# Patient Record
Sex: MALE | Race: OTHER | Employment: FULL TIME | URBAN - METROPOLITAN AREA
[De-identification: names, ages, dates, MRNs, and addresses within clinical notes are randomized per-mention and may not be internally consistent; named-entity substitution may affect disease eponyms.]

---

## 2017-07-06 ENCOUNTER — OFFICE VISIT (OUTPATIENT)
Dept: INTERNAL MEDICINE CLINIC | Facility: CLINIC | Age: 22
End: 2017-07-06

## 2017-07-06 VITALS
OXYGEN SATURATION: 99 % | SYSTOLIC BLOOD PRESSURE: 122 MMHG | WEIGHT: 175.19 LBS | DIASTOLIC BLOOD PRESSURE: 82 MMHG | HEART RATE: 76 BPM | BODY MASS INDEX: 26.55 KG/M2 | TEMPERATURE: 99 F | HEIGHT: 68 IN

## 2017-07-06 DIAGNOSIS — K21.9 GASTROESOPHAGEAL REFLUX DISEASE, ESOPHAGITIS PRESENCE NOT SPECIFIED: Primary | ICD-10-CM

## 2017-07-06 DIAGNOSIS — Z00.00 ANNUAL PHYSICAL EXAM: ICD-10-CM

## 2017-07-06 PROBLEM — Z80.0 FAMILY HISTORY OF STOMACH CANCER: Status: ACTIVE | Noted: 2017-07-06

## 2017-07-06 PROCEDURE — 99202 OFFICE O/P NEW SF 15 MIN: CPT | Performed by: FAMILY MEDICINE

## 2017-07-06 RX ORDER — PANTOPRAZOLE SODIUM 40 MG/1
40 TABLET, DELAYED RELEASE ORAL
Qty: 90 TABLET | Refills: 0 | Status: SHIPPED | OUTPATIENT
Start: 2017-07-06 | End: 2018-02-20

## 2017-07-07 NOTE — PROGRESS NOTES
CC:  Sore Throat and Gastro-esophageal Reflux      Hx of CC:  SEVERAL MONTHS WITH ACID REFLUX WHICH USED TO RESPOND TO TUMS BUT NOW DOES NOT. SOMETIMES GETS SORE/IRRITATED THROAT DUE TO IT.  NOT ASSOCIATED WITH MEALS.     PMHX:  NO CHRONIC ILLNESSES  PSHX:

## 2017-07-15 ENCOUNTER — NURSE ONLY (OUTPATIENT)
Dept: INTERNAL MEDICINE CLINIC | Facility: CLINIC | Age: 22
End: 2017-07-15

## 2017-07-15 DIAGNOSIS — Z00.00 ANNUAL PHYSICAL EXAM: ICD-10-CM

## 2017-07-15 LAB
CHOLEST SERPL-MCNC: 149 MG/DL (ref 110–200)
GLUCOSE SERPL-MCNC: 94 MG/DL (ref 70–99)
HDLC SERPL-MCNC: 47 MG/DL
LDLC SERPL CALC-MCNC: 90 MG/DL (ref 0–99)
NONHDLC SERPL-MCNC: 102 MG/DL
TRIGL SERPL-MCNC: 59 MG/DL (ref 1–149)

## 2017-07-15 PROCEDURE — 82947 ASSAY GLUCOSE BLOOD QUANT: CPT | Performed by: FAMILY MEDICINE

## 2017-07-15 PROCEDURE — 36415 COLL VENOUS BLD VENIPUNCTURE: CPT | Performed by: FAMILY MEDICINE

## 2017-07-15 PROCEDURE — 80061 LIPID PANEL: CPT | Performed by: FAMILY MEDICINE

## 2018-02-20 ENCOUNTER — OFFICE VISIT (OUTPATIENT)
Dept: INTERNAL MEDICINE CLINIC | Facility: CLINIC | Age: 23
End: 2018-02-20

## 2018-02-20 VITALS
RESPIRATION RATE: 12 BRPM | BODY MASS INDEX: 26.98 KG/M2 | HEIGHT: 68 IN | TEMPERATURE: 99 F | OXYGEN SATURATION: 98 % | SYSTOLIC BLOOD PRESSURE: 110 MMHG | WEIGHT: 178 LBS | HEART RATE: 90 BPM | DIASTOLIC BLOOD PRESSURE: 80 MMHG

## 2018-02-20 DIAGNOSIS — Z30.09 FAMILY PLANNING: Primary | ICD-10-CM

## 2018-02-20 PROCEDURE — 99212 OFFICE O/P EST SF 10 MIN: CPT | Performed by: FAMILY MEDICINE

## 2018-02-21 NOTE — PROGRESS NOTES
CC:  Consult (Pt presents to clinic to discuss possible vasectomy)      Hx of CC:  INTERESTED IN GETTING A VASECTOMY. HAS A 9YEAR OLD SON AND RECENTLY HAD TWINS WITH WIFE (THEY ARE 6 MONTHS OLD). PATIENT HAS RESEARCHED TOPIC.     Vitals:    02/20/18  181

## 2018-03-19 ENCOUNTER — OFFICE VISIT (OUTPATIENT)
Dept: FAMILY MEDICINE CLINIC | Facility: CLINIC | Age: 23
End: 2018-03-19

## 2018-03-19 VITALS
SYSTOLIC BLOOD PRESSURE: 110 MMHG | BODY MASS INDEX: 26.98 KG/M2 | WEIGHT: 178 LBS | RESPIRATION RATE: 17 BRPM | DIASTOLIC BLOOD PRESSURE: 64 MMHG | HEIGHT: 68 IN | OXYGEN SATURATION: 98 % | HEART RATE: 62 BPM

## 2018-03-19 DIAGNOSIS — K42.9 UMBILICAL HERNIA WITHOUT OBSTRUCTION AND WITHOUT GANGRENE: Primary | ICD-10-CM

## 2018-03-19 PROCEDURE — 99213 OFFICE O/P EST LOW 20 MIN: CPT | Performed by: FAMILY MEDICINE

## 2018-03-19 NOTE — PROGRESS NOTES
HPI:    Patient ID: Anayeli Yanes is a 20year old male.    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  Gets sharp pain on area wit ASSESSMENT/PLAN:   Umbilical hernia without obstruction and without gangrene  (primary encounter diagnosis)  -VSS  -no red flags  -Clinical course, prognosis, and treatment options of disease process discussed with pt.  -obtain U/S to confirm as no bulgi

## 2018-03-19 NOTE — PATIENT INSTRUCTIONS
What Is a Hernia? A hernia is when an organ or tissue pushes through a weak area in the belly (abdominal) wall. This weak area may be present at birth. Or it may be caused by abdominal strain over time.  If not treated, a hernia can get worse with time swelling around your hernia becomes larger, firmer, or more painful. These may be signs that your intestines are stuck in the abdominal wall. This is an emergency. The hernia must be repaired right away to avoid serious problems.   Date Last Reviewed: 7/1/2 abdominal wall. · Umbilical. This type occurs around the navel (belly button). · Incisional. This type occurs at the site of a previous surgery. The condition of the hernia can help determine how urgently it needs to be treated. · Reducible.  It goes ba care.  When to seek medical advice  Call your healthcare provider right away if any of these occur:  · Hernia hardens, swells, or grows larger  · Hernia can no longer be pushed back in  · Pain moves to the lower right abdomen (just below the waistline), or

## 2018-03-27 ENCOUNTER — OFFICE VISIT (OUTPATIENT)
Dept: SURGERY | Facility: CLINIC | Age: 23
End: 2018-03-27

## 2018-03-27 VITALS
HEIGHT: 66 IN | SYSTOLIC BLOOD PRESSURE: 136 MMHG | TEMPERATURE: 99 F | HEART RATE: 73 BPM | WEIGHT: 180 LBS | BODY MASS INDEX: 28.93 KG/M2 | DIASTOLIC BLOOD PRESSURE: 66 MMHG

## 2018-03-27 DIAGNOSIS — Z30.09 VASECTOMY EVALUATION: Primary | ICD-10-CM

## 2018-03-27 PROCEDURE — 99212 OFFICE O/P EST SF 10 MIN: CPT | Performed by: UROLOGY

## 2018-03-27 PROCEDURE — 99244 OFF/OP CNSLTJ NEW/EST MOD 40: CPT | Performed by: UROLOGY

## 2018-03-27 RX ORDER — DIAZEPAM 10 MG/1
TABLET ORAL
Qty: 1 TABLET | Refills: 0 | Status: SHIPPED | OUTPATIENT
Start: 2018-03-27 | End: 2018-04-11 | Stop reason: ALTCHOICE

## 2018-03-27 NOTE — PROGRESS NOTES
[address] Patient Status:  Outpatient    3/11/1995 MRN [id number]   Location [address], [place] Attending Connor Abbott.  [address] Day [address] 0 PCP Shalonda Bliss DO       UROLOGICAL CONSULTATION    CASE duodenal ulcer, gastritis, hiatal hernia, esophagitis, reflux, frequent constipation or exposure to hepatitis. 6.   No complaints of flank pain or kidney disease in the form of nephritis or glomerulonephritis.   7.   There is no history of orthopedic compl bilaterally symmetrical without bruits. Flank is without mass or tenderness. Abdomen is soft, nontender, normal active bowel sounds with no organomegaly or inguinal adenopathy. Suprapubic area is soft without evidence of bladder distention.     Gu exam s reasons for, nature of and alternatives with the patient including other alternatives and methods of birth control.   We do discuss that in one 1/200 patients have infection, and 1/200 patients can have a hematoma;  1/200 patients can have chronic testicula MD    ......................................... 3/27/2018

## 2018-03-31 ENCOUNTER — HOSPITAL ENCOUNTER (OUTPATIENT)
Dept: ULTRASOUND IMAGING | Facility: HOSPITAL | Age: 23
Discharge: HOME | End: 2018-03-31
Attending: FAMILY MEDICINE
Payer: COMMERCIAL

## 2018-03-31 DIAGNOSIS — K42.9 UMBILICAL HERNIA WITHOUT OBSTRUCTION AND WITHOUT GANGRENE: ICD-10-CM

## 2018-03-31 PROCEDURE — 76705 ECHO EXAM OF ABDOMEN: CPT | Performed by: FAMILY MEDICINE

## 2018-04-11 ENCOUNTER — OFFICE VISIT (OUTPATIENT)
Dept: FAMILY MEDICINE CLINIC | Facility: CLINIC | Age: 23
End: 2018-04-11

## 2018-04-11 VITALS
RESPIRATION RATE: 18 BRPM | HEIGHT: 66 IN | SYSTOLIC BLOOD PRESSURE: 120 MMHG | OXYGEN SATURATION: 98 % | DIASTOLIC BLOOD PRESSURE: 78 MMHG | WEIGHT: 177 LBS | BODY MASS INDEX: 28.45 KG/M2 | HEART RATE: 79 BPM

## 2018-04-11 DIAGNOSIS — R10.13 ACUTE EPIGASTRIC PAIN: ICD-10-CM

## 2018-04-11 DIAGNOSIS — R10.32 GROIN PAIN, LEFT: Primary | ICD-10-CM

## 2018-04-11 DIAGNOSIS — K42.9 UMBILICAL HERNIA WITHOUT OBSTRUCTION AND WITHOUT GANGRENE: ICD-10-CM

## 2018-04-11 PROCEDURE — 83690 ASSAY OF LIPASE: CPT | Performed by: FAMILY MEDICINE

## 2018-04-11 PROCEDURE — 85027 COMPLETE CBC AUTOMATED: CPT | Performed by: FAMILY MEDICINE

## 2018-04-11 PROCEDURE — 99213 OFFICE O/P EST LOW 20 MIN: CPT | Performed by: FAMILY MEDICINE

## 2018-04-11 PROCEDURE — 80053 COMPREHEN METABOLIC PANEL: CPT | Performed by: FAMILY MEDICINE

## 2018-04-11 NOTE — PROGRESS NOTES
HPI:    Patient ID: Remy Castellanos is a 20year old male. Abdominal Pain  Has known umbilical hernia  continues to feel increased bloating after eating  With lifting has pain in epigastric region right above hernia.  Lifts often 30-40lbs at work and has 3 Cardiovascular: Normal rate, regular rhythm and normal heart sounds. No murmur heard. Pulmonary/Chest: Breath sounds normal. No respiratory distress. He has no wheezes. He has no rales. Abdominal: Soft.  Bowel sounds are normal. He exhibits no diste Visit:  No prescriptions requested or ordered in this encounter    Imaging & Referrals:  SURGERY - INTERNAL  US GROIN LEFT COMPLETE ([id number]=[id number])         ST#[id number]

## 2018-04-30 ENCOUNTER — HOSPITAL ENCOUNTER (OUTPATIENT)
Dept: ULTRASOUND IMAGING | Age: 23
Discharge: HOME | End: 2018-04-30
Payer: COMMERCIAL

## 2018-04-30 ENCOUNTER — HOSPITAL ENCOUNTER (OUTPATIENT)
Dept: ULTRASOUND IMAGING | Age: 23
Discharge: HOME | End: 2018-04-30
Attending: FAMILY MEDICINE
Payer: COMMERCIAL

## 2018-04-30 DIAGNOSIS — N45.1 EPIDIDYMITIS: ICD-10-CM

## 2018-04-30 DIAGNOSIS — R10.32 GROIN PAIN, LEFT: ICD-10-CM

## 2018-04-30 PROCEDURE — 76870 US EXAM SCROTUM: CPT

## 2018-04-30 PROCEDURE — 76881 US COMPL JOINT R-T W/IMG: CPT | Performed by: FAMILY MEDICINE

## 2018-04-30 PROCEDURE — 93975 VASCULAR STUDY: CPT

## 2018-05-01 ENCOUNTER — TELEPHONE (OUTPATIENT)
Dept: FAMILY MEDICINE CLINIC | Facility: CLINIC | Age: 23
End: 2018-05-01

## 2018-05-01 NOTE — TELEPHONE ENCOUNTER
----- Message from Marivel Izaguirre MD sent at 4/30/2018  7:28 PM CDT -----  Will refer to Surgery for evaluation; ok to file.

## 2018-05-03 NOTE — TELEPHONE ENCOUNTER
Patient called back, notified of results and referral to surgery. Given Dr. Sherren Bair number. Verbalized understanding.

## 2018-05-08 ENCOUNTER — OFFICE VISIT (OUTPATIENT)
Dept: SURGERY | Facility: CLINIC | Age: 23
End: 2018-05-08

## 2018-05-08 VITALS — WEIGHT: 177 LBS | BODY MASS INDEX: 28.45 KG/M2 | HEIGHT: 66 IN

## 2018-05-08 DIAGNOSIS — K40.90 NON-RECURRENT UNILATERAL INGUINAL HERNIA WITHOUT OBSTRUCTION OR GANGRENE: Primary | ICD-10-CM

## 2018-05-08 PROCEDURE — 99204 OFFICE O/P NEW MOD 45 MIN: CPT | Performed by: SURGERY

## 2018-05-08 PROCEDURE — 99212 OFFICE O/P EST SF 10 MIN: CPT | Performed by: SURGERY

## 2018-05-08 NOTE — PROGRESS NOTES
HPI:    Patient ID: Bebe Soria is a 20year old male presenting with Patient presents with:  Hernia: Pt referred  by Dr. Hernán Arredondoor regarding umbilical and LIH x 1 month.   Pt c/o pain to umbilical area w/ heavy lifting and left groin area is more of a cons Physical Exam   Vitals reviewed. Constitutional: He is oriented to person, place, and time. Vital signs are normal. He appears well-developed and well-nourished. HENT:   Head: Normocephalic and atraumatic. Neck: Normal range of motion. Neck supple.

## 2018-05-09 ENCOUNTER — TELEPHONE (OUTPATIENT)
Dept: SURGERY | Facility: CLINIC | Age: 23
End: 2018-05-09

## 2018-05-09 DIAGNOSIS — K42.9 UMBILICAL HERNIA WITHOUT OBSTRUCTION AND WITHOUT GANGRENE: ICD-10-CM

## 2018-05-09 DIAGNOSIS — K40.90 LEFT INGUINAL HERNIA: Primary | ICD-10-CM

## 2018-05-09 NOTE — TELEPHONE ENCOUNTER
Pt states he is aware he is getting hernia repaired by groin area, pt asking if umbilical hernia is being repaired as well. Pls call thank you.

## 2018-05-10 ENCOUNTER — TELEPHONE (OUTPATIENT)
Dept: SURGERY | Facility: CLINIC | Age: 23
End: 2018-05-10

## 2018-05-10 NOTE — TELEPHONE ENCOUNTER
Pt's wife calling with questions regarding surgery. States pt is having pain on the opposite side of his hernia. Would like to know if both sides can be repaired. Please advise. Thank you.

## 2018-05-10 NOTE — TELEPHONE ENCOUNTER
Paged Dr. Sury Gillis at [phone]. Contacted patient. Informed him we did receive his messages and Dr. Sury Gillis was paged.  Patient states starting after his appt on 05/08/2018 he began to experience tenderness to right inguinal area that is the same as when he noticed h

## 2018-05-10 NOTE — TELEPHONE ENCOUNTER
Discussed with Dr. Traci Bellamy. Tiny asymptomatic umbilical hernia was noted upon exam, no surgery indicated for asymptomatic hernia. For right inguinal tenderness, if no bulge present then not concerned for hernia. Contacted patient and informed him of above.

## 2018-05-10 NOTE — TELEPHONE ENCOUNTER
pt called. Scheduled for hernia repair on Monday 5/14 with Dr. Milena Zuniga. Patient states he is having pain on the right side now. He thinks maybe its another hernia.   Please advise

## 2018-05-11 NOTE — TELEPHONE ENCOUNTER
POS and CPT codes updated. Please approval below.        REFERRED TO: [facility]  REFERRED FOR:SEE BELOW    Diagnoses:    K40.90 (ICD-10-CM) - 550.90 (ICD-9-CM) - Left inguinal hernia  Procedures:    37778 (CPT®) - LAPAROSCOPIC REPAIR OF INI

## 2018-05-11 NOTE — TELEPHONE ENCOUNTER
URGENT. Patient scheduled 05/14/2018 at [facility] for Left inguinal hernia repair, CPT: 40194. Referral was placed 05/09/2018. Please advise, thanks kindly.

## 2018-05-11 NOTE — TELEPHONE ENCOUNTER
Contacted patient. Surgery rescheduled to 05/17 at [facility]. Patient will receive call 05/16/2018 from [facility] with exact time and details. Patient verbalized understanding and all questions answered.

## 2018-05-11 NOTE — TELEPHONE ENCOUNTER
Patient changed date, location, and surgery to Laparoscopic left inguinal hernia repair with mesh, laparoscopic umbilical hernia repair, possible right inguinal hernia repair with mesh on 05/17/2018 at [address] [place] [address], [address] [place] [address] [address]: [address] [place] [address] , [id number], [id number]. Thank you!

## 2018-05-17 ENCOUNTER — ANESTHESIA (OUTPATIENT)
Dept: SURGERY | Facility: HOSPITAL | Age: 23
End: 2018-05-17
Payer: COMMERCIAL

## 2018-05-17 ENCOUNTER — HOSPITAL ENCOUNTER (OUTPATIENT)
Facility: HOSPITAL | Age: 23
Setting detail: HOSPITAL OUTPATIENT SURGERY
Discharge: HOME | End: 2018-05-17
Attending: SURGERY | Admitting: SURGERY
Payer: COMMERCIAL

## 2018-05-17 ENCOUNTER — SURGERY (OUTPATIENT)
Age: 23
End: 2018-05-17

## 2018-05-17 ENCOUNTER — ANESTHESIA EVENT (OUTPATIENT)
Dept: SURGERY | Facility: HOSPITAL | Age: 23
End: 2018-05-17
Payer: COMMERCIAL

## 2018-05-17 VITALS
WEIGHT: 170 LBS | SYSTOLIC BLOOD PRESSURE: 121 MMHG | OXYGEN SATURATION: 94 % | TEMPERATURE: 98 F | HEIGHT: 66 IN | HEART RATE: 85 BPM | RESPIRATION RATE: 18 BRPM | BODY MASS INDEX: 27.32 KG/M2 | DIASTOLIC BLOOD PRESSURE: 64 MMHG

## 2018-05-17 DIAGNOSIS — K40.90 LEFT INGUINAL HERNIA: ICD-10-CM

## 2018-05-17 DIAGNOSIS — K42.9 UMBILICAL HERNIA: Primary | ICD-10-CM

## 2018-05-17 DIAGNOSIS — K42.9 UMBILICAL HERNIA WITHOUT OBSTRUCTION AND WITHOUT GANGRENE: ICD-10-CM

## 2018-05-17 PROCEDURE — 49650 LAP ING HERNIA REPAIR INIT: CPT | Performed by: SURGERY

## 2018-05-17 PROCEDURE — 0YU64JZ SUPPLEMENT LEFT INGUINAL REGION WITH SYNTHETIC SUBSTITUTE, PERCUTANEOUS ENDOSCOPIC APPROACH: ICD-10-PCS | Performed by: SURGERY

## 2018-05-17 PROCEDURE — 49585 REPAIR UMBILICAL HERN,5+Y/O,REDUC: CPT | Performed by: SURGERY

## 2018-05-17 PROCEDURE — 0WQF4ZZ REPAIR ABDOMINAL WALL, PERCUTANEOUS ENDOSCOPIC APPROACH: ICD-10-PCS | Performed by: SURGERY

## 2018-05-17 DEVICE — PROGRIP MESH: Type: IMPLANTABLE DEVICE | Site: INGUINAL | Status: FUNCTIONAL

## 2018-05-17 RX ORDER — NALOXONE HYDROCHLORIDE 0.4 MG/ML
80 INJECTION, SOLUTION INTRAMUSCULAR; INTRAVENOUS; SUBCUTANEOUS AS NEEDED
Status: DISCONTINUED | OUTPATIENT
Start: 2018-05-17 | End: 2018-05-17

## 2018-05-17 RX ORDER — MEPERIDINE HYDROCHLORIDE 50 MG/ML
50 INJECTION INTRAMUSCULAR; INTRAVENOUS; SUBCUTANEOUS ONCE
Status: DISCONTINUED | OUTPATIENT
Start: 2018-05-17 | End: 2018-05-17

## 2018-05-17 RX ORDER — HYDROMORPHONE HYDROCHLORIDE 1 MG/ML
0.4 INJECTION, SOLUTION INTRAMUSCULAR; INTRAVENOUS; SUBCUTANEOUS EVERY 5 MIN PRN
Status: DISCONTINUED | OUTPATIENT
Start: 2018-05-17 | End: 2018-05-17

## 2018-05-17 RX ORDER — HALOPERIDOL 5 MG/ML
0.25 INJECTION INTRAMUSCULAR ONCE AS NEEDED
Status: DISCONTINUED | OUTPATIENT
Start: 2018-05-17 | End: 2018-05-17

## 2018-05-17 RX ORDER — GLYCOPYRROLATE 0.2 MG/ML
INJECTION INTRAMUSCULAR; INTRAVENOUS AS NEEDED
Status: DISCONTINUED | OUTPATIENT
Start: 2018-05-17 | End: 2018-05-17 | Stop reason: SURG

## 2018-05-17 RX ORDER — HYDROCODONE BITARTRATE AND ACETAMINOPHEN 10; 325 MG/1; MG/1
1 TABLET ORAL EVERY 4 HOURS PRN
Qty: 30 TABLET | Refills: 0 | Status: SHIPPED | OUTPATIENT
Start: 2018-05-17 | End: 2018-08-08 | Stop reason: ALTCHOICE

## 2018-05-17 RX ORDER — CEFAZOLIN SODIUM/WATER 2 G/20 ML
2 SYRINGE (ML) INTRAVENOUS ONCE
Status: COMPLETED | OUTPATIENT
Start: 2018-05-17 | End: 2018-05-17

## 2018-05-17 RX ORDER — SODIUM CHLORIDE, SODIUM LACTATE, POTASSIUM CHLORIDE, CALCIUM CHLORIDE 600; 310; 30; 20 MG/100ML; MG/100ML; MG/100ML; MG/100ML
INJECTION, SOLUTION INTRAVENOUS CONTINUOUS
Status: DISCONTINUED | OUTPATIENT
Start: 2018-05-17 | End: 2018-05-17

## 2018-05-17 RX ORDER — MIDAZOLAM HYDROCHLORIDE 1 MG/ML
INJECTION INTRAMUSCULAR; INTRAVENOUS AS NEEDED
Status: DISCONTINUED | OUTPATIENT
Start: 2018-05-17 | End: 2018-05-17 | Stop reason: SURG

## 2018-05-17 RX ORDER — HYDROCODONE BITARTRATE AND ACETAMINOPHEN 10; 325 MG/1; MG/1
1 TABLET ORAL EVERY 4 HOURS PRN
Status: DISCONTINUED | OUTPATIENT
Start: 2018-05-17 | End: 2018-05-17

## 2018-05-17 RX ORDER — FAMOTIDINE 20 MG/1
20 TABLET ORAL ONCE
Status: DISCONTINUED | OUTPATIENT
Start: 2018-05-17 | End: 2018-05-17 | Stop reason: HOSPADM

## 2018-05-17 RX ORDER — ROCURONIUM BROMIDE 10 MG/ML
INJECTION, SOLUTION INTRAVENOUS AS NEEDED
Status: DISCONTINUED | OUTPATIENT
Start: 2018-05-17 | End: 2018-05-17 | Stop reason: SURG

## 2018-05-17 RX ORDER — HYDROMORPHONE HYDROCHLORIDE 1 MG/ML
0.6 INJECTION, SOLUTION INTRAMUSCULAR; INTRAVENOUS; SUBCUTANEOUS EVERY 5 MIN PRN
Status: DISCONTINUED | OUTPATIENT
Start: 2018-05-17 | End: 2018-05-17

## 2018-05-17 RX ORDER — NEOSTIGMINE METHYLSULFATE 0.5 MG/ML
INJECTION INTRAVENOUS AS NEEDED
Status: DISCONTINUED | OUTPATIENT
Start: 2018-05-17 | End: 2018-05-17 | Stop reason: SURG

## 2018-05-17 RX ORDER — BUPIVACAINE HYDROCHLORIDE 5 MG/ML
INJECTION, SOLUTION EPIDURAL; INTRACAUDAL AS NEEDED
Status: DISCONTINUED | OUTPATIENT
Start: 2018-05-17 | End: 2018-05-17 | Stop reason: HOSPADM

## 2018-05-17 RX ORDER — HYDROMORPHONE HYDROCHLORIDE 1 MG/ML
0.2 INJECTION, SOLUTION INTRAMUSCULAR; INTRAVENOUS; SUBCUTANEOUS EVERY 5 MIN PRN
Status: DISCONTINUED | OUTPATIENT
Start: 2018-05-17 | End: 2018-05-17

## 2018-05-17 RX ORDER — DOCUSATE SODIUM 100 MG/1
100 CAPSULE, LIQUID FILLED ORAL 3 TIMES DAILY
Qty: 30 CAPSULE | Refills: 2 | Status: SHIPPED | OUTPATIENT
Start: 2018-05-17 | End: 2018-06-16

## 2018-05-17 RX ORDER — ONDANSETRON 2 MG/ML
4 INJECTION INTRAMUSCULAR; INTRAVENOUS ONCE AS NEEDED
Status: DISCONTINUED | OUTPATIENT
Start: 2018-05-17 | End: 2018-05-17

## 2018-05-17 RX ORDER — KETOROLAC TROMETHAMINE 30 MG/ML
30 INJECTION, SOLUTION INTRAMUSCULAR; INTRAVENOUS ONCE
Status: COMPLETED | OUTPATIENT
Start: 2018-05-17 | End: 2018-05-17

## 2018-05-17 RX ORDER — LIDOCAINE HYDROCHLORIDE 10 MG/ML
INJECTION, SOLUTION EPIDURAL; INFILTRATION; INTRACAUDAL; PERINEURAL AS NEEDED
Status: DISCONTINUED | OUTPATIENT
Start: 2018-05-17 | End: 2018-05-17 | Stop reason: SURG

## 2018-05-17 RX ORDER — ACETAMINOPHEN 500 MG
1000 TABLET ORAL ONCE
Status: COMPLETED | OUTPATIENT
Start: 2018-05-17 | End: 2018-05-17

## 2018-05-17 RX ORDER — HYDROCODONE BITARTRATE AND ACETAMINOPHEN 5; 325 MG/1; MG/1
2 TABLET ORAL AS NEEDED
Status: DISCONTINUED | OUTPATIENT
Start: 2018-05-17 | End: 2018-05-17

## 2018-05-17 RX ORDER — METOCLOPRAMIDE 10 MG/1
10 TABLET ORAL ONCE
Status: DISCONTINUED | OUTPATIENT
Start: 2018-05-17 | End: 2018-05-17 | Stop reason: HOSPADM

## 2018-05-17 RX ORDER — HYDROCODONE BITARTRATE AND ACETAMINOPHEN 5; 325 MG/1; MG/1
1 TABLET ORAL AS NEEDED
Status: DISCONTINUED | OUTPATIENT
Start: 2018-05-17 | End: 2018-05-17

## 2018-05-17 RX ORDER — ONDANSETRON 2 MG/ML
INJECTION INTRAMUSCULAR; INTRAVENOUS AS NEEDED
Status: DISCONTINUED | OUTPATIENT
Start: 2018-05-17 | End: 2018-05-17 | Stop reason: SURG

## 2018-05-17 RX ORDER — METOCLOPRAMIDE HYDROCHLORIDE 5 MG/ML
INJECTION INTRAMUSCULAR; INTRAVENOUS AS NEEDED
Status: DISCONTINUED | OUTPATIENT
Start: 2018-05-17 | End: 2018-05-17 | Stop reason: SURG

## 2018-05-17 RX ORDER — MEPERIDINE HYDROCHLORIDE 50 MG/ML
25 INJECTION INTRAMUSCULAR; INTRAVENOUS; SUBCUTANEOUS ONCE
Status: COMPLETED | OUTPATIENT
Start: 2018-05-17 | End: 2018-05-17

## 2018-05-17 RX ORDER — CEFAZOLIN SODIUM/WATER 2 G/20 ML
2 SYRINGE (ML) INTRAVENOUS ONCE
Status: DISCONTINUED | OUTPATIENT
Start: 2018-05-17 | End: 2018-05-17

## 2018-05-17 RX ORDER — PHENYLEPHRINE HCL 10 MG/ML
VIAL (ML) INJECTION AS NEEDED
Status: DISCONTINUED | OUTPATIENT
Start: 2018-05-17 | End: 2018-05-17 | Stop reason: SURG

## 2018-05-17 RX ADMIN — SODIUM CHLORIDE, SODIUM LACTATE, POTASSIUM CHLORIDE, CALCIUM CHLORIDE: 600; 310; 30; 20 INJECTION, SOLUTION INTRAVENOUS at 18:54:00

## 2018-05-17 RX ADMIN — CEFAZOLIN SODIUM/WATER 2 G: 2 G/20 ML SYRINGE (ML) INTRAVENOUS at 17:26:00

## 2018-05-17 RX ADMIN — ONDANSETRON 4 MG: 2 INJECTION INTRAMUSCULAR; INTRAVENOUS at 18:40:00

## 2018-05-17 RX ADMIN — METOCLOPRAMIDE HYDROCHLORIDE 10 MG: 5 INJECTION INTRAMUSCULAR; INTRAVENOUS at 18:41:00

## 2018-05-17 RX ADMIN — MIDAZOLAM HYDROCHLORIDE 2 MG: 1 INJECTION INTRAMUSCULAR; INTRAVENOUS at 17:22:00

## 2018-05-17 RX ADMIN — PHENYLEPHRINE HCL 100 MCG: 10 MG/ML VIAL (ML) INJECTION at 17:25:00

## 2018-05-17 RX ADMIN — ROCURONIUM BROMIDE 30 MG: 10 INJECTION, SOLUTION INTRAVENOUS at 17:22:00

## 2018-05-17 RX ADMIN — SODIUM CHLORIDE, SODIUM LACTATE, POTASSIUM CHLORIDE, CALCIUM CHLORIDE: 600; 310; 30; 20 INJECTION, SOLUTION INTRAVENOUS at 17:00:00

## 2018-05-17 RX ADMIN — LIDOCAINE HYDROCHLORIDE 50 MG: 10 INJECTION, SOLUTION EPIDURAL; INFILTRATION; INTRACAUDAL; PERINEURAL at 17:22:00

## 2018-05-17 RX ADMIN — NEOSTIGMINE METHYLSULFATE 2 MG: 0.5 INJECTION INTRAVENOUS at 18:35:00

## 2018-05-17 RX ADMIN — GLYCOPYRROLATE 0.2 MG: 0.2 INJECTION INTRAMUSCULAR; INTRAVENOUS at 17:30:00

## 2018-05-17 RX ADMIN — GLYCOPYRROLATE 0.4 MG: 0.2 INJECTION INTRAMUSCULAR; INTRAVENOUS at 18:35:00

## 2018-05-17 NOTE — OPERATIVE REPORT
Operative Report    Patient Name:  Jodee Agarwal  MR:  [id number]  :  3/11/1995  DOS:  18    Preop Dx: Left inguinal hernia [A11.89]  Umbilical hernia without obstruction and without gangrene [K42.9]  Postop Dx:   Left inguinal hernia [K40.90]Umb site to insert a 12-mm trocar. No trocar insertion injury was seen. A 5-mm trocar was placed in the left lower quadrant and a 5-mm trocar was placed in the right lower quadrant. The patient was then placed in Trendelenburg.   Examination of the pelvi

## 2018-05-17 NOTE — ANESTHESIA PREPROCEDURE EVALUATION
Anesthesia PreOp Note    HPI:     Jewels Jiang is a 20year old male who presents for preoperative consultation requested by: Sae Verde MD    Date of Surgery: 5/17/2018    Procedure(s):  LAPAROSCOPIC INGUINAL HERNIA REPAIR, PREPERITONEAL  LAPAROSCOPIC U Former Smoker     Types: Cigarettes    Quit date: 2/20/2016    Smokeless tobacco: Former User    Alcohol use Yes  3.0 oz/week    5 Cans of beer per week         Drug use: No    Sexual activity: Not on file     Other Topics Concern   None on file     Social patient desires the anesthetic management as planned.   Albert James  5/17/2018 2:30 PM

## 2018-05-17 NOTE — H&P
HP Update:    Pt seen and examined in the preoperative holding area. No significant clinical update noted. Plan to proceed with a laparoscopic right possible left inguinal hernia repair with mesh and a concurrent primary repair of an umbilical hernia.

## 2018-05-17 NOTE — ANESTHESIA POSTPROCEDURE EVALUATION
Patient: Jaky Gonzalez    Procedure Summary     Date:  05/17/18 Room / Location:  [facility] MAIN OR  / [address] [place] [address] MAIN OR    Anesthesia Start:  6451 Anesthesia Stop:      Procedures:       LAPAROSCOPIC INGUINAL HERNIA REPAIR, PREPERITONEAL (Left Abdomen)      LAPARO

## 2018-05-22 ENCOUNTER — TELEPHONE (OUTPATIENT)
Dept: SURGERY | Facility: CLINIC | Age: 23
End: 2018-05-22

## 2018-05-22 NOTE — TELEPHONE ENCOUNTER
Spoke with patient, he states he will wait until his appointment to get his return to work note. Also asked about dressing removal, the dressing covering his navel, stating it itches him.   Advised patient to remove the tape, keep the gauze in his navel an

## 2018-05-22 NOTE — TELEPHONE ENCOUNTER
Pt's wife is calling stating pt had surgery on 5-17-18. Pt needs a note to return to work on 5-29-18 include restrictions. Please add not to mychart. Caller also has question on the bandages.    Call

## 2018-05-25 ENCOUNTER — OFFICE VISIT (OUTPATIENT)
Dept: SURGERY | Facility: CLINIC | Age: 23
End: 2018-05-25

## 2018-05-25 VITALS — BODY MASS INDEX: 27 KG/M2 | WEIGHT: 170 LBS

## 2018-05-25 DIAGNOSIS — Z98.890 S/P INGUINAL HERNIA REPAIR: Primary | ICD-10-CM

## 2018-05-25 DIAGNOSIS — Z87.19 S/P INGUINAL HERNIA REPAIR: Primary | ICD-10-CM

## 2018-05-25 PROCEDURE — 99212 OFFICE O/P EST SF 10 MIN: CPT | Performed by: SURGERY

## 2018-05-25 PROCEDURE — 99024 POSTOP FOLLOW-UP VISIT: CPT | Performed by: SURGERY

## 2018-05-25 NOTE — PROGRESS NOTES
S:  Jewels Jiang is a 20year old male sp lap inguinal hernia repair with mesh and primary umbilical hernia repair  Doing well, tolerating a general diet with normal bowel habits      O:  Wt 170 lb (77.1 kg)   BMI 27.44 kg/m²   GEN:  No acute distress  A

## 2018-06-05 ENCOUNTER — TELEPHONE (OUTPATIENT)
Dept: SURGERY | Facility: CLINIC | Age: 23
End: 2018-06-05

## 2018-06-05 NOTE — TELEPHONE ENCOUNTER
Pt had hernia sx on 5/17, pt is scheduled for VAS on 6/8, asking if it is ok to have procedure? Pls call thank you.

## 2018-06-06 NOTE — TELEPHONE ENCOUNTER
Tasked to [facility]. Please see pt's question below and advise.

## 2018-06-07 ENCOUNTER — TELEPHONE (OUTPATIENT)
Dept: SURGERY | Facility: CLINIC | Age: 23
End: 2018-06-07

## 2018-06-07 NOTE — TELEPHONE ENCOUNTER
Patient has a vas scheduled for tomorrow. Is looking to cancel and reschedule. Please call. Thank you.

## 2018-06-07 NOTE — TELEPHONE ENCOUNTER
Please contact patient and we can simply examine patient on the day of scheduled vasectomy but as far as I am concerned I do not think the hernia should have much bearing on him being able to have vasectomy

## 2018-06-07 NOTE — TELEPHONE ENCOUNTER
Pt returned the call. Pt cxl vas scheduled 6-8-18 with dr Vandana Wick. Per  staff, pt offered appt for consult with dr Lauro Slade. appt now scheduled 8-8-18.

## 2018-06-08 NOTE — TELEPHONE ENCOUNTER
Noted.  Patient already had cancelled his vasectomy and is now rescheduled to see new Urologist in Aug.

## 2018-06-19 ENCOUNTER — OFFICE VISIT (OUTPATIENT)
Dept: SURGERY | Facility: CLINIC | Age: 23
End: 2018-06-19

## 2018-06-19 DIAGNOSIS — Z98.890 S/P INGUINAL HERNIA REPAIR: Primary | ICD-10-CM

## 2018-06-19 DIAGNOSIS — Z87.19 S/P INGUINAL HERNIA REPAIR: Primary | ICD-10-CM

## 2018-06-19 PROCEDURE — 99212 OFFICE O/P EST SF 10 MIN: CPT | Performed by: SURGERY

## 2018-06-19 PROCEDURE — 99024 POSTOP FOLLOW-UP VISIT: CPT | Performed by: SURGERY

## 2018-08-07 ENCOUNTER — TELEPHONE (OUTPATIENT)
Dept: SURGERY | Facility: CLINIC | Age: 23
End: 2018-08-07

## 2018-08-07 NOTE — TELEPHONE ENCOUNTER
Pt's spouse states pt has been having abdominal pain since yesterday. Pt thinks he may have pulled his hernia while picking up something yesterday and he has been walking with pain ever since. Please advise. Thank you.

## 2018-08-07 NOTE — TELEPHONE ENCOUNTER
Per patient, he states he was having abdominal pain, lifted a gallon of water prior to this. Offered him an appointment for 8/8/2018, patient accepted.

## 2018-08-08 ENCOUNTER — OFFICE VISIT (OUTPATIENT)
Dept: SURGERY | Facility: CLINIC | Age: 23
End: 2018-08-08

## 2018-08-08 VITALS
DIASTOLIC BLOOD PRESSURE: 75 MMHG | HEIGHT: 66 IN | HEART RATE: 75 BPM | SYSTOLIC BLOOD PRESSURE: 120 MMHG | TEMPERATURE: 99 F | WEIGHT: 177 LBS | BODY MASS INDEX: 28.45 KG/M2

## 2018-08-08 DIAGNOSIS — Z30.09 FAMILY PLANNING: ICD-10-CM

## 2018-08-08 DIAGNOSIS — Z87.19 S/P INGUINAL HERNIA REPAIR: Primary | ICD-10-CM

## 2018-08-08 DIAGNOSIS — Z30.09 STERILIZATION CONSULT: ICD-10-CM

## 2018-08-08 DIAGNOSIS — Z98.890 S/P INGUINAL HERNIA REPAIR: Primary | ICD-10-CM

## 2018-08-08 DIAGNOSIS — Z30.09 ENCOUNTER FOR VASECTOMY ASSESSMENT: Primary | ICD-10-CM

## 2018-08-08 PROCEDURE — 99244 OFF/OP CNSLTJ NEW/EST MOD 40: CPT | Performed by: UROLOGY

## 2018-08-08 PROCEDURE — 99024 POSTOP FOLLOW-UP VISIT: CPT | Performed by: SURGERY

## 2018-08-08 PROCEDURE — 99212 OFFICE O/P EST SF 10 MIN: CPT | Performed by: UROLOGY

## 2018-08-08 PROCEDURE — 99212 OFFICE O/P EST SF 10 MIN: CPT | Performed by: SURGERY

## 2018-08-08 RX ORDER — DIAZEPAM 10 MG/1
10 TABLET ORAL ONCE
Qty: 1 TABLET | Refills: 0 | Status: SHIPPED | OUTPATIENT
Start: 2018-08-08 | End: 2018-08-08

## 2018-08-08 RX ORDER — HYDROCODONE BITARTRATE AND ACETAMINOPHEN 5; 325 MG/1; MG/1
1 TABLET ORAL EVERY 6 HOURS PRN
Qty: 5 TABLET | Refills: 0 | Status: SHIPPED | OUTPATIENT
Start: 2018-08-08 | End: 2018-08-09

## 2018-08-08 NOTE — PROGRESS NOTES
[facility], [facility] Urology  Initial Office Consultation    HPI:   Christina Stafford is a 20year old male here today for evaluation for bilateral vasectomy has a mode of sterilization and family planning.   He is  and has 1kids (9year-old son and 1-year- prescription, however do not start taking the pills. Bring them with you to the urology office on the day of your procedure. The office staff will instruct you when and how much to take.  Disp: 5 tablet Rfl: 0       Allergies: Patient has no known allergies vasa bilaterally   Musculoskeletal: Normal range of motion. Neurological: He is alert and oriented to person, place, and time. Skin: Skin is warm and dry. Psychiatric: He has a normal mood and affect.        LABS:      Lab Results  Component Value Varun vas occlusion known to have a low occlusive failure rate has been used. · Patients should refrain from ejaculation for approximately one week after vasectomy.     · Options for fertility after vasectomy include vasectomy reversal and sperm retrieval with

## 2018-08-08 NOTE — PROGRESS NOTES
S:  Jodee Agarwal is a 20year old male sp lap left inguinal hernia repair with mesh  Had recent brief left groin discomfort. Saw commercial regarding mesh complications and wife insisted that he return for a visit.       O:  VSS  GEN:  No acute distress

## 2018-08-09 ENCOUNTER — TELEPHONE (OUTPATIENT)
Dept: SURGERY | Facility: CLINIC | Age: 23
End: 2018-08-09

## 2018-08-09 NOTE — TELEPHONE ENCOUNTER
Spoke with ELISSA and he gave auth to reschd pt to 8/24 at 1 pm.   I called the pt back and informed him that I could offer an appt for 8/24 at 1 pm and he accepted and I reschd the appt in the system.

## 2018-08-09 NOTE — TELEPHONE ENCOUNTER
Patient would like to know if vasectomy can be rescheduled. Currently scheduled for 08/17. Would like to reschedule to the following week on the 08/24/18 if possible. Please call. Thank you.

## 2018-08-24 ENCOUNTER — OFFICE VISIT (OUTPATIENT)
Dept: SURGERY | Facility: CLINIC | Age: 23
End: 2018-08-24

## 2018-08-24 VITALS
SYSTOLIC BLOOD PRESSURE: 110 MMHG | DIASTOLIC BLOOD PRESSURE: 80 MMHG | WEIGHT: 175 LBS | HEART RATE: 78 BPM | TEMPERATURE: 98 F | RESPIRATION RATE: 16 BRPM | BODY MASS INDEX: 28.13 KG/M2 | HEIGHT: 66 IN

## 2018-08-24 DIAGNOSIS — Z30.8 POSTVASECTOMY SPERM COUNT: Primary | ICD-10-CM

## 2018-08-24 DIAGNOSIS — Z30.2 ENCOUNTER FOR STERILIZATION: ICD-10-CM

## 2018-08-24 PROCEDURE — 55250 REMOVAL OF SPERM DUCT(S): CPT | Performed by: UROLOGY

## 2018-08-24 NOTE — PROCEDURES
UROLOGY PROCEDURE NOTE      PREOPERATIVE DIAGNOSIS:          Desires voluntary sterilization - bilateral vasectomy. POSTOPERATIVE DIAGNOSIS:         Same. PROCEDURE PERFORMED: Voluntary sterilization - bilateral vasectomy.       ANESTHESIA:   Ke Rank small Telfa dressing, then a small 2 x 2 sterile dressing, then Tegaderm dressing. Each segment of vas deferens was sent separately in formalin to pathology for identification. The patient tolerated the procedure well.        [facility]  08/24/18 3:1

## 2018-09-18 ENCOUNTER — OFFICE VISIT (OUTPATIENT)
Dept: SURGERY | Facility: CLINIC | Age: 23
End: 2018-09-18

## 2018-09-18 VITALS
SYSTOLIC BLOOD PRESSURE: 120 MMHG | BODY MASS INDEX: 28.93 KG/M2 | TEMPERATURE: 98 F | WEIGHT: 180 LBS | HEIGHT: 66 IN | DIASTOLIC BLOOD PRESSURE: 88 MMHG | RESPIRATION RATE: 16 BRPM | HEART RATE: 73 BPM

## 2018-09-18 DIAGNOSIS — Z98.52 S/P VASECTOMY: Primary | ICD-10-CM

## 2018-09-18 NOTE — PROGRESS NOTES
[facility], [facility] Urology  Follow Up Visit    HPI: Kristian Gold is a 20year old male presents for a follow up visit for vasectomy. Patient is s/p bilateral vasectomy in the office on 08/24/2018. He is doing well.  He complained of some mild discomfort and

## 2018-12-12 ENCOUNTER — TELEPHONE (OUTPATIENT)
Dept: SURGERY | Facility: CLINIC | Age: 23
End: 2018-12-12

## 2019-04-23 ENCOUNTER — OFFICE VISIT (OUTPATIENT)
Dept: INTERNAL MEDICINE CLINIC | Facility: CLINIC | Age: 24
End: 2019-04-23

## 2019-04-23 VITALS
OXYGEN SATURATION: 98 % | HEART RATE: 66 BPM | DIASTOLIC BLOOD PRESSURE: 86 MMHG | SYSTOLIC BLOOD PRESSURE: 126 MMHG | WEIGHT: 178.38 LBS | BODY MASS INDEX: 28.67 KG/M2 | HEIGHT: 66 IN

## 2019-04-23 DIAGNOSIS — K29.90 GASTRITIS AND DUODENITIS: Primary | ICD-10-CM

## 2019-04-23 PROCEDURE — 99213 OFFICE O/P EST LOW 20 MIN: CPT | Performed by: FAMILY MEDICINE

## 2019-04-23 RX ORDER — PANTOPRAZOLE SODIUM 40 MG/1
40 TABLET, DELAYED RELEASE ORAL
Qty: 90 TABLET | Refills: 0 | Status: SHIPPED | OUTPATIENT
Start: 2019-04-23 | End: 2021-12-07 | Stop reason: ALTCHOICE

## 2019-04-24 NOTE — PROGRESS NOTES
CC:  Other (Patient complains of burning sensation in stomach)      Hx of CC:  2 WEEK H/O INTERMITTENT STOMACH BURNING SENSATION AND BLOATING-->A LITTLE WORSE POST MEALS. GERD FORMERLY IMPROVED WITH TREATMENT. FATHER HAD H/O GASTRIC CA.     Vitals:    0

## 2019-04-30 ENCOUNTER — NURSE ONLY (OUTPATIENT)
Dept: INTERNAL MEDICINE CLINIC | Facility: CLINIC | Age: 24
End: 2019-04-30

## 2019-04-30 PROCEDURE — 87338 HPYLORI STOOL AG IA: CPT | Performed by: FAMILY MEDICINE

## 2019-05-09 ENCOUNTER — OFFICE VISIT (OUTPATIENT)
Dept: INTERNAL MEDICINE CLINIC | Facility: CLINIC | Age: 24
End: 2019-05-09

## 2019-05-09 VITALS
DIASTOLIC BLOOD PRESSURE: 72 MMHG | HEART RATE: 70 BPM | HEIGHT: 66 IN | OXYGEN SATURATION: 99 % | RESPIRATION RATE: 17 BRPM | BODY MASS INDEX: 28.26 KG/M2 | SYSTOLIC BLOOD PRESSURE: 120 MMHG | WEIGHT: 175.81 LBS | TEMPERATURE: 99 F

## 2019-05-09 DIAGNOSIS — R10.11 POSTPRANDIAL RUQ PAIN: Primary | ICD-10-CM

## 2019-05-09 PROCEDURE — 99213 OFFICE O/P EST LOW 20 MIN: CPT | Performed by: FAMILY MEDICINE

## 2019-05-10 NOTE — PROGRESS NOTES
CC:  Medication Follow-Up (Patient presents for f/u on last visit & medication )      Hx of CC:  GASTRITIS AND ACID REFLUX RESOLVED POST PANTOPRAZOLE. HOWEVER, MILD NAUSEA AND POST PRANDIAL BLOATING/DISCOMFORT STILL PRESENT AT TIMES.     Vitals:    05/09/1

## 2019-06-01 ENCOUNTER — HOSPITAL ENCOUNTER (OUTPATIENT)
Dept: ULTRASOUND IMAGING | Facility: HOSPITAL | Age: 24
Discharge: HOME | End: 2019-06-01
Attending: FAMILY MEDICINE
Payer: COMMERCIAL

## 2019-06-01 DIAGNOSIS — R10.11 POSTPRANDIAL RUQ PAIN: ICD-10-CM

## 2019-06-01 PROCEDURE — 76705 ECHO EXAM OF ABDOMEN: CPT | Performed by: FAMILY MEDICINE

## 2019-09-21 ENCOUNTER — OFFICE VISIT (OUTPATIENT)
Dept: FAMILY MEDICINE CLINIC | Facility: CLINIC | Age: 24
End: 2019-09-21

## 2019-09-21 VITALS
WEIGHT: 190 LBS | SYSTOLIC BLOOD PRESSURE: 120 MMHG | DIASTOLIC BLOOD PRESSURE: 74 MMHG | OXYGEN SATURATION: 98 % | BODY MASS INDEX: 30.53 KG/M2 | TEMPERATURE: 98 F | HEART RATE: 76 BPM | HEIGHT: 66 IN

## 2019-09-21 DIAGNOSIS — H66.002 NON-RECURRENT ACUTE SUPPURATIVE OTITIS MEDIA OF LEFT EAR WITHOUT SPONTANEOUS RUPTURE OF TYMPANIC MEMBRANE: Primary | ICD-10-CM

## 2019-09-21 PROCEDURE — 99213 OFFICE O/P EST LOW 20 MIN: CPT | Performed by: NURSE PRACTITIONER

## 2019-09-21 RX ORDER — AMOXICILLIN 875 MG/1
875 TABLET, COATED ORAL 2 TIMES DAILY
Qty: 20 TABLET | Refills: 0 | Status: SHIPPED | OUTPATIENT
Start: 2019-09-21 | End: 2019-10-01

## 2019-09-21 NOTE — PROGRESS NOTES
CHIEF COMPLAINT:   Patient presents with:  Pain: pt states he has pain in throat on left side when putting pressure or when yawning x 2 wks       HPI:   Sindi Case is a 30year old male who presents to clinic today with complaints of persisting pain Right TM: clear, no bulging, no retraction, no effusion, bony landmarks intact. Left TM: erythematous, no bulging, no retraction, no effusion, bony landmarks intact.   NOSE: nostrils patent, no nasal discharge, nasal mucosa pink and noninflamed  THROAT: o The following are general care guidelines:  · Finish all of the antibiotic medicine given, even though you may feel better after the first few days.   · You may use over-the-counter medicine, such as acetaminophen or ibuprofen, to control pain and fever, un

## 2019-11-26 ENCOUNTER — HOSPITAL ENCOUNTER (EMERGENCY)
Facility: HOSPITAL | Age: 24
Discharge: HOME | End: 2019-11-27
Attending: EMERGENCY MEDICINE
Payer: COMMERCIAL

## 2019-11-26 ENCOUNTER — HOSPITAL ENCOUNTER (OUTPATIENT)
Age: 24
Discharge: HOME | End: 2019-11-26
Attending: EMERGENCY MEDICINE
Payer: COMMERCIAL

## 2019-11-26 VITALS
DIASTOLIC BLOOD PRESSURE: 78 MMHG | OXYGEN SATURATION: 97 % | HEART RATE: 118 BPM | TEMPERATURE: 102 F | RESPIRATION RATE: 20 BRPM | WEIGHT: 180 LBS | HEIGHT: 66 IN | BODY MASS INDEX: 28.93 KG/M2 | SYSTOLIC BLOOD PRESSURE: 133 MMHG

## 2019-11-26 DIAGNOSIS — J18.9 PNEUMONIA OF LEFT LOWER LOBE DUE TO INFECTIOUS ORGANISM: Primary | ICD-10-CM

## 2019-11-26 DIAGNOSIS — B34.9 VIRAL SYNDROME: Primary | ICD-10-CM

## 2019-11-26 PROCEDURE — 99213 OFFICE O/P EST LOW 20 MIN: CPT

## 2019-11-26 PROCEDURE — 99284 EMERGENCY DEPT VISIT MOD MDM: CPT

## 2019-11-26 PROCEDURE — 99204 OFFICE O/P NEW MOD 45 MIN: CPT

## 2019-11-26 PROCEDURE — 87430 STREP A AG IA: CPT

## 2019-11-26 PROCEDURE — 96361 HYDRATE IV INFUSION ADD-ON: CPT

## 2019-11-26 PROCEDURE — 96374 THER/PROPH/DIAG INJ IV PUSH: CPT

## 2019-11-26 RX ORDER — AZITHROMYCIN 250 MG/1
TABLET, FILM COATED ORAL
Qty: 1 PACKAGE | Refills: 0 | Status: SHIPPED | OUTPATIENT
Start: 2019-11-26 | End: 2020-02-24 | Stop reason: ALTCHOICE

## 2019-11-26 RX ORDER — ECHINACEA PURPUREA EXTRACT 125 MG
2 TABLET ORAL AS NEEDED
Qty: 60 ML | Refills: 0 | Status: SHIPPED | OUTPATIENT
Start: 2019-11-26 | End: 2019-12-01

## 2019-11-26 RX ORDER — BENZONATATE 100 MG/1
100 CAPSULE ORAL 3 TIMES DAILY PRN
Qty: 30 CAPSULE | Refills: 0 | Status: SHIPPED | OUTPATIENT
Start: 2019-11-26 | End: 2019-12-26

## 2019-11-26 RX ORDER — ALBUTEROL SULFATE 90 UG/1
2 AEROSOL, METERED RESPIRATORY (INHALATION) EVERY 4 HOURS PRN
Qty: 1 INHALER | Refills: 0 | Status: SHIPPED | OUTPATIENT
Start: 2019-11-26 | End: 2019-12-26

## 2019-11-26 RX ORDER — METHYLPREDNISOLONE 4 MG/1
TABLET ORAL
Qty: 1 PACKAGE | Refills: 0 | Status: SHIPPED | OUTPATIENT
Start: 2019-11-26 | End: 2020-02-24 | Stop reason: ALTCHOICE

## 2019-11-26 RX ORDER — FLUTICASONE PROPIONATE 50 MCG
2 SPRAY, SUSPENSION (ML) NASAL DAILY
Qty: 16 G | Refills: 0 | Status: SHIPPED | OUTPATIENT
Start: 2019-11-26 | End: 2019-12-26

## 2019-11-26 RX ORDER — KETOROLAC TROMETHAMINE 30 MG/ML
30 INJECTION, SOLUTION INTRAMUSCULAR; INTRAVENOUS ONCE
Status: COMPLETED | OUTPATIENT
Start: 2019-11-26 | End: 2019-11-27

## 2019-11-27 ENCOUNTER — APPOINTMENT (OUTPATIENT)
Dept: GENERAL RADIOLOGY | Facility: HOSPITAL | Age: 24
End: 2019-11-27
Attending: EMERGENCY MEDICINE
Payer: COMMERCIAL

## 2019-11-27 VITALS
DIASTOLIC BLOOD PRESSURE: 74 MMHG | OXYGEN SATURATION: 95 % | TEMPERATURE: 99 F | RESPIRATION RATE: 18 BRPM | HEART RATE: 96 BPM | SYSTOLIC BLOOD PRESSURE: 108 MMHG

## 2019-11-27 PROCEDURE — 71045 X-RAY EXAM CHEST 1 VIEW: CPT | Performed by: EMERGENCY MEDICINE

## 2019-11-27 PROCEDURE — 80048 BASIC METABOLIC PNL TOTAL CA: CPT | Performed by: EMERGENCY MEDICINE

## 2019-11-27 PROCEDURE — 85025 COMPLETE CBC W/AUTO DIFF WBC: CPT | Performed by: EMERGENCY MEDICINE

## 2019-11-27 NOTE — ED NOTES
Patient states that he was at [address] [place] [address] and diagnosed with PNA. Patient states that she has been having more trouble breathing since diagnosis. Patient also stating that he is dizzy at times and has vomited PTA.  Patient stated that he has taken his first

## 2019-11-27 NOTE — ED INITIAL ASSESSMENT (HPI)
Pt c/o uri symptoms today with recent pneumonia dx with difficulty breathing, + vomiting at home, feeling worse.

## 2019-11-27 NOTE — ED PROVIDER NOTES
Patient Seen in: [facility] AND [facility] Emergency Department      History   Patient presents with:  Cough    Stated Complaint: Chest tightness, lightheaded, dx w/ pneumonia 2 hours ago    HPI    12-year-old [facility]. Negative for abdominal pain, diarrhea and vomiting. Genitourinary: Negative for dysuria, flank pain and frequency. Musculoskeletal: Negative for back pain. Skin: Negative for rash.    Neurological: Positive for dizziness and light place, and time.       Comments: 5/5 strength in b/l UEs and LEs, normal sensation in all extremities, normal finger to nose b/l, normal gait, no facial asymmetry, normal speech             ED Course       Pulse Oximeter:  Pulse oximetry on room air is 95%, Available and Reviewed by me while in ED:        XRAY CHEST ONE VIEW  0012 ct    Comparison: None      IMPRESSION:  Mild increased opacity seen in the right lung base, could be related to overlapping shadows but raises concern for pneumonia.  PA/lateral tawana exam and reviewing the diagnostics, multiple initial diagnoses were considered based on the presenting problem including pneumonia, viral syndrome, bronchitis, wheezing.           Disposition and Plan     Clinical Impression:  Viral syndrome  (primary encou

## 2019-11-27 NOTE — ED PROVIDER NOTES
Patient Seen in: [facility] AND CLINICS Immediate Care In [address] [place] [address]    History   Patient presents with:  Cough/URI    Stated Complaint: cold sx    HPI    Patient here with cough, congestion for 4-5 days. No travel, no known sick contacts.   Patient denies sig Former Smoker        Types: Cigarettes        Quit date: 2/20/2016        Years since quitting: 3.7      Smokeless tobacco: Former User    Alcohol use:  Yes      Alcohol/week: 5.0 standard drinks      Types: 5 Cans of beer per week    Drug use: No      Revi Prescribed:  Current Discharge Medication List    START taking these medications    Albuterol Sulfate  (90 Base) MCG/ACT Inhalation Aero Soln  Inhale 2 puffs into the lungs every 4 (four) hours as needed for Wheezing.   Qty: 1 Inhaler Refills: 0    S

## 2020-02-24 ENCOUNTER — OFFICE VISIT (OUTPATIENT)
Dept: INTERNAL MEDICINE CLINIC | Facility: CLINIC | Age: 25
End: 2020-02-24

## 2020-02-24 VITALS
HEART RATE: 70 BPM | OXYGEN SATURATION: 98 % | WEIGHT: 177 LBS | DIASTOLIC BLOOD PRESSURE: 84 MMHG | SYSTOLIC BLOOD PRESSURE: 134 MMHG | BODY MASS INDEX: 28.45 KG/M2 | HEIGHT: 66 IN

## 2020-02-24 DIAGNOSIS — L81.4 LENTIGO: Primary | ICD-10-CM

## 2020-02-24 PROCEDURE — 99212 OFFICE O/P EST SF 10 MIN: CPT | Performed by: FAMILY MEDICINE

## 2020-02-25 NOTE — PROGRESS NOTES
CC:  Pain (Pt presents to clinic for c/o groin pain x 3 days. )      Hx of CC:  FRECKLES ON PENILE SHAFT (SOME ON BODY ALSO)--NEW ONES HAVE APPEARED OVER PAST SEVERAL MONTHS. MILD PENILE SHAFT SORENESS X 3D, NO INJURY. NO DYSURIA.     MONOGAMOUS WITH WIFE

## 2020-08-25 ENCOUNTER — OFFICE VISIT (OUTPATIENT)
Dept: INTERNAL MEDICINE CLINIC | Facility: CLINIC | Age: 25
End: 2020-08-25

## 2020-08-25 VITALS
OXYGEN SATURATION: 98 % | HEART RATE: 76 BPM | DIASTOLIC BLOOD PRESSURE: 80 MMHG | RESPIRATION RATE: 17 BRPM | SYSTOLIC BLOOD PRESSURE: 122 MMHG | WEIGHT: 167 LBS | BODY MASS INDEX: 26.84 KG/M2 | HEIGHT: 66 IN

## 2020-08-25 DIAGNOSIS — S39.011A STRAIN OF ABDOMINAL MUSCLE, INITIAL ENCOUNTER: ICD-10-CM

## 2020-08-25 DIAGNOSIS — Z00.00 ANNUAL PHYSICAL EXAM: Primary | ICD-10-CM

## 2020-08-25 PROCEDURE — 3008F BODY MASS INDEX DOCD: CPT | Performed by: FAMILY MEDICINE

## 2020-08-25 PROCEDURE — G0438 PPPS, INITIAL VISIT: HCPCS | Performed by: FAMILY MEDICINE

## 2020-08-25 PROCEDURE — 99395 PREV VISIT EST AGE 18-39: CPT | Performed by: FAMILY MEDICINE

## 2020-08-25 PROCEDURE — 3079F DIAST BP 80-89 MM HG: CPT | Performed by: FAMILY MEDICINE

## 2020-08-25 PROCEDURE — 3074F SYST BP LT 130 MM HG: CPT | Performed by: FAMILY MEDICINE

## 2020-08-26 NOTE — PROGRESS NOTES
Janice Morales is a 30year old male who presents for a complete physical exam.   HPI:     Concerns:  HAD SOME ABDOMINAL WALL PAIN POST EXERTION 2 WEEKS AGO--RESOLVED      Wt Readings from Last 6 Encounters:  08/25/20 : 167 lb (75.8 kg)  02/24/20 : 177 lb : Y. Children: Y. Exercise:     Diet:     REVIEW OF SYSTEMS:   GENERAL: feels well otherwise.  HAS LOST SOME WEIGHT BUT MORE ACTIVE  SKIN: denies any unusual skin lesions  EYES:denies vision change  HEENT: no hearing changes  LUNGS: denies shortnes

## 2020-08-28 ENCOUNTER — NURSE ONLY (OUTPATIENT)
Dept: INTERNAL MEDICINE CLINIC | Facility: CLINIC | Age: 25
End: 2020-08-28

## 2020-08-28 DIAGNOSIS — Z00.00 ANNUAL PHYSICAL EXAM: ICD-10-CM

## 2020-08-28 LAB
ALBUMIN SERPL-MCNC: 4.2 G/DL (ref 3.4–5)
ALBUMIN/GLOB SERPL: 1.2 {RATIO} (ref 1–2)
ALP LIVER SERPL-CCNC: 91 U/L (ref 45–117)
ALT SERPL-CCNC: 47 U/L (ref 16–61)
ANION GAP SERPL CALC-SCNC: 3 MMOL/L (ref 0–18)
AST SERPL-CCNC: 27 U/L (ref 15–37)
BILIRUB SERPL-MCNC: 0.9 MG/DL (ref 0.1–2)
BUN BLD-MCNC: 13 MG/DL (ref 7–18)
BUN/CREAT SERPL: 12.6 (ref 10–20)
CALCIUM BLD-MCNC: 9.3 MG/DL (ref 8.5–10.1)
CHLORIDE SERPL-SCNC: 107 MMOL/L (ref 98–112)
CHOLEST SMN-MCNC: 151 MG/DL (ref ?–200)
CO2 SERPL-SCNC: 30 MMOL/L (ref 21–32)
CREAT BLD-MCNC: 1.03 MG/DL (ref 0.7–1.3)
GLOBULIN PLAS-MCNC: 3.4 G/DL (ref 2.8–4.4)
GLUCOSE BLD-MCNC: 78 MG/DL (ref 70–99)
HDLC SERPL-MCNC: 60 MG/DL (ref 40–59)
LDLC SERPL CALC-MCNC: 78 MG/DL (ref ?–100)
M PROTEIN MFR SERPL ELPH: 7.6 G/DL (ref 6.4–8.2)
NONHDLC SERPL-MCNC: 91 MG/DL (ref ?–130)
OSMOLALITY SERPL CALC.SUM OF ELEC: 289 MOSM/KG (ref 275–295)
PATIENT FASTING Y/N/NP: NO
PATIENT FASTING Y/N/NP: NO
POTASSIUM SERPL-SCNC: 4.2 MMOL/L (ref 3.5–5.1)
SODIUM SERPL-SCNC: 140 MMOL/L (ref 136–145)
TRIGL SERPL-MCNC: 63 MG/DL (ref 30–149)
TSI SER-ACNC: 2.79 MIU/ML (ref 0.36–3.74)
VLDLC SERPL CALC-MCNC: 13 MG/DL (ref 0–30)

## 2020-08-28 PROCEDURE — 80061 LIPID PANEL: CPT | Performed by: FAMILY MEDICINE

## 2020-08-28 PROCEDURE — 84443 ASSAY THYROID STIM HORMONE: CPT | Performed by: FAMILY MEDICINE

## 2020-08-28 PROCEDURE — 80053 COMPREHEN METABOLIC PANEL: CPT | Performed by: FAMILY MEDICINE

## 2020-08-28 PROCEDURE — 36415 COLL VENOUS BLD VENIPUNCTURE: CPT | Performed by: FAMILY MEDICINE

## 2020-11-13 ENCOUNTER — OFFICE VISIT (OUTPATIENT)
Dept: FAMILY MEDICINE CLINIC | Facility: CLINIC | Age: 25
End: 2020-11-13

## 2020-11-13 DIAGNOSIS — Z20.822 SUSPECTED COVID-19 VIRUS INFECTION: Primary | ICD-10-CM

## 2020-11-13 PROCEDURE — 99213 OFFICE O/P EST LOW 20 MIN: CPT | Performed by: NURSE PRACTITIONER

## 2020-11-13 PROCEDURE — 3079F DIAST BP 80-89 MM HG: CPT | Performed by: NURSE PRACTITIONER

## 2020-11-13 PROCEDURE — 3008F BODY MASS INDEX DOCD: CPT | Performed by: NURSE PRACTITIONER

## 2020-11-13 PROCEDURE — 3074F SYST BP LT 130 MM HG: CPT | Performed by: NURSE PRACTITIONER

## 2020-11-13 NOTE — PROGRESS NOTES
CHIEF COMPLAINT:   Patient presents with:  Covid: exposed on Saturday 11/07      HPI:   Kristian Gold is a 30year old male who presents for Covid 19 exposure 6 days ago. Requesting covid testing.   At this time, not experiencing upper respiratory symptom suspicious lesions  EYES: conjunctiva clear  EARS: TM's clear grey, no bulging, no retraction, no fluid, bony landmarks visualized  NOSE: Nostrils patent, clear nasal discharge, nasal mucosa pink  THROAT: Oral mucosa pink, moist. Posterior pharynx is not e negative test result may help clear an individual from the recommended quarantine recommendations.   • Those who have tested COVID+ and experience severe symptoms** and /or require a hospital stay may need to remain isolated for up to 20 days depending on s separate bathroom, if available. Animals: Do not handle pets or other animals while sick. Call ahead before visiting your doctor  If you have a medical appointment, call the healthcare provider and tell them that you have or may have COVID-19.  This \"high-touch\" surfaces every day  High touch surfaces include counters, tabletops, doorknobs, bathroom fixtures, toilets, phones, keyboards, tablets, and beside tables. Also, clean any surfaces that may have blood, stool, or body fluids on them.  Use a maxim COVID-19:  • Stay home from work, school, and away from other public places. If you must go out, avoid using any kind of public transportation, ridesharing, or taxis. • Monitor your symptoms carefully.  If your symptoms get worse, call your healthcare prov

## 2020-11-13 NOTE — PATIENT INSTRUCTIONS
Patient Isolation Advice:  • Those with mild symptoms*, are presumed to have COVID unless they test negative and have been cleared by their physician and / or an alternate diagnosis to explain their symptoms has been established.   • Those with mild symptom to do if you are sick with coronavirus disease 2019 (COVID-19):   If you are sick with COVID-19, or suspect you are infected with the virus that causes COVID-19, follow the steps below to help prevent the disease from spreading to people in your home and co personal household items  You should not share dishes, drink glasses, cups, eating utensils, towels, or bedding with other people or pets in your home. After using these items, they should be washed thoroughly with soap and water.     Clean your hands often you have a medical emergency and need to call 911, notify the dispatch personnel that you have, or are being evaluated for COVID-19. If possible, put on a facemask before emergency medical services personnel arrive.     Discontinuing home isolation  Patient & Prevention (CDC)  What to do if you are sick with coronavirus disease 2019, [email or web address]. pdf  Centers for Disease Control & Prevention (CDC)  10 things you can do to manage your health

## 2020-11-18 VITALS
OXYGEN SATURATION: 98 % | RESPIRATION RATE: 18 BRPM | BODY MASS INDEX: 28.25 KG/M2 | HEART RATE: 78 BPM | TEMPERATURE: 98 F | SYSTOLIC BLOOD PRESSURE: 128 MMHG | DIASTOLIC BLOOD PRESSURE: 80 MMHG | HEIGHT: 67 IN | WEIGHT: 180 LBS

## 2021-12-07 ENCOUNTER — OFFICE VISIT (OUTPATIENT)
Dept: FAMILY MEDICINE CLINIC | Facility: CLINIC | Age: 26
End: 2021-12-07

## 2021-12-07 VITALS
WEIGHT: 177 LBS | HEART RATE: 76 BPM | DIASTOLIC BLOOD PRESSURE: 70 MMHG | OXYGEN SATURATION: 98 % | BODY MASS INDEX: 27.78 KG/M2 | SYSTOLIC BLOOD PRESSURE: 116 MMHG | HEIGHT: 67 IN

## 2021-12-07 DIAGNOSIS — Z00.00 ANNUAL PHYSICAL EXAM: Primary | ICD-10-CM

## 2021-12-07 DIAGNOSIS — D22.9 BENIGN MOLE: ICD-10-CM

## 2021-12-07 DIAGNOSIS — Z11.59 NEED FOR HEPATITIS C SCREENING TEST: ICD-10-CM

## 2021-12-07 PROCEDURE — 3008F BODY MASS INDEX DOCD: CPT | Performed by: INTERNAL MEDICINE

## 2021-12-07 PROCEDURE — 99385 PREV VISIT NEW AGE 18-39: CPT | Performed by: INTERNAL MEDICINE

## 2021-12-07 PROCEDURE — 3078F DIAST BP <80 MM HG: CPT | Performed by: INTERNAL MEDICINE

## 2021-12-07 PROCEDURE — G0438 PPPS, INITIAL VISIT: HCPCS | Performed by: INTERNAL MEDICINE

## 2021-12-07 PROCEDURE — 3074F SYST BP LT 130 MM HG: CPT | Performed by: INTERNAL MEDICINE

## 2021-12-10 NOTE — PROGRESS NOTES
[facility]  New Patient History and Physical      HPI:   Patient presents with:  Physical  Derm Problem: l thumb dory Gonzalez is a 27year old male presenting for:  Establishment of care.   Has  has a past medical history SURGICAL HISTORY     • SKIN SURGERY      Lip surgery   • UMBILICAL HERNIA REPAIR N/A 05/17/2018   • VASECTOMY  08/24/2018    Dr. Zoila Mayen       Allergies:  No Known Allergies   Social History:  Social History    Tobacco Use      Smoking status: Former [employer] PHYSICAL EXAM:   /70 (BP Location: Right arm, Patient Position: Sitting, Cuff Size: adult)   Pulse 76   Ht 5' 7\" (1.702 m)   Wt 177 lb (80.3 kg)   SpO2 98%   BMI 27.72 kg/m²  Estimated body mass index is 27.72 kg/m² as calculated from the followin Behavior: Behavior normal.         Thought Content:  Thought content normal.         Judgment: Judgment normal.             ASSESSMENT AND PLAN:   Patient is a 27year old male who presents primarily presents for:    (Z00.00) Annual physical exam  (dada

## 2021-12-15 ENCOUNTER — LAB ENCOUNTER (OUTPATIENT)
Dept: LAB | Facility: REFERENCE LAB | Age: 26
End: 2021-12-15
Attending: INTERNAL MEDICINE
Payer: COMMERCIAL

## 2021-12-15 DIAGNOSIS — Z11.59 NEED FOR HEPATITIS C SCREENING TEST: ICD-10-CM

## 2021-12-15 DIAGNOSIS — Z00.00 ANNUAL PHYSICAL EXAM: ICD-10-CM

## 2021-12-15 PROCEDURE — 85025 COMPLETE CBC W/AUTO DIFF WBC: CPT | Performed by: INTERNAL MEDICINE

## 2021-12-15 PROCEDURE — 80053 COMPREHEN METABOLIC PANEL: CPT

## 2021-12-15 PROCEDURE — 86803 HEPATITIS C AB TEST: CPT

## 2021-12-15 PROCEDURE — 80061 LIPID PANEL: CPT

## 2021-12-15 PROCEDURE — 36415 COLL VENOUS BLD VENIPUNCTURE: CPT

## 2023-02-08 ENCOUNTER — OFFICE VISIT (OUTPATIENT)
Dept: INTERNAL MEDICINE CLINIC | Facility: CLINIC | Age: 28
End: 2023-02-08
Payer: COMMERCIAL

## 2023-02-08 VITALS
WEIGHT: 185 LBS | BODY MASS INDEX: 29.03 KG/M2 | HEIGHT: 67 IN | DIASTOLIC BLOOD PRESSURE: 84 MMHG | RESPIRATION RATE: 14 BRPM | HEART RATE: 79 BPM | TEMPERATURE: 98 F | SYSTOLIC BLOOD PRESSURE: 122 MMHG

## 2023-02-08 DIAGNOSIS — Z00.00 HEALTH MAINTENANCE EXAMINATION: Primary | ICD-10-CM

## 2023-02-08 DIAGNOSIS — K21.9 GASTROESOPHAGEAL REFLUX DISEASE, UNSPECIFIED WHETHER ESOPHAGITIS PRESENT: ICD-10-CM

## 2023-02-08 DIAGNOSIS — E66.3 OVERWEIGHT (BMI 25.0-29.9): ICD-10-CM

## 2023-02-08 DIAGNOSIS — M25.512 ACUTE PAIN OF LEFT SHOULDER: ICD-10-CM

## 2023-02-08 DIAGNOSIS — R68.84 PAIN IN LOWER JAW: ICD-10-CM

## 2023-02-08 PROBLEM — K29.90 GASTRITIS AND DUODENITIS: Status: RESOLVED | Noted: 2019-04-23 | Resolved: 2023-02-08

## 2023-02-08 PROBLEM — Z80.0 FAMILY HISTORY OF STOMACH CANCER: Status: RESOLVED | Noted: 2017-07-06 | Resolved: 2023-02-08

## 2023-02-08 PROBLEM — Z30.09 VASECTOMY EVALUATION: Status: RESOLVED | Noted: 2018-03-27 | Resolved: 2023-02-08

## 2023-02-08 PROBLEM — K40.90 NON-RECURRENT UNILATERAL INGUINAL HERNIA WITHOUT OBSTRUCTION OR GANGRENE: Status: RESOLVED | Noted: 2018-05-08 | Resolved: 2023-02-08

## 2023-02-08 PROCEDURE — 3008F BODY MASS INDEX DOCD: CPT | Performed by: FAMILY MEDICINE

## 2023-02-08 PROCEDURE — G0438 PPPS, INITIAL VISIT: HCPCS | Performed by: FAMILY MEDICINE

## 2023-02-08 PROCEDURE — 90715 TDAP VACCINE 7 YRS/> IM: CPT | Performed by: FAMILY MEDICINE

## 2023-02-08 PROCEDURE — 3079F DIAST BP 80-89 MM HG: CPT | Performed by: FAMILY MEDICINE

## 2023-02-08 PROCEDURE — 99395 PREV VISIT EST AGE 18-39: CPT | Performed by: FAMILY MEDICINE

## 2023-02-08 PROCEDURE — 90471 IMMUNIZATION ADMIN: CPT | Performed by: FAMILY MEDICINE

## 2023-02-08 PROCEDURE — 3074F SYST BP LT 130 MM HG: CPT | Performed by: FAMILY MEDICINE

## 2023-02-08 NOTE — PATIENT INSTRUCTIONS
PATIENT INSTRUCTIONS    Thank you for seeing me today, it was a pleasure taking care of you. Please check out at the  and schedule a follow up appointment. Return in about 1 year (around 2/8/2024) for physical.  The following imaging studies were ordered: None  Please also follow up with the following specialists: None. Can consider physical therapy  Please fill out the advance directive information (power of  documents) and bring a copy to our clinic.   Work on [employer] and exercise  Consider flu vaccine, COVID booster  Stretches and exercises for your shoulder  Can use heating pad as needed  Can use voltaren gel, lidocaine patches as needed  Advil as needed       Best,   Dr. Rajani Paris

## 2023-02-08 NOTE — ASSESSMENT & PLAN NOTE
Patient with left shoulder pain as well as left chest wall pectoralis pain. There could be component of mild small biceps tendinitis as well. Discussed that he may benefit from physical therapy-however he prefers home exercises for now which were provided. Discussed that if he desires physical therapy he can message me and I can refer him to a physical therapist as needed. Can use oral NSAIDs as needed. Voltaren gel, lidocaine patches as needed. Follow-up as needed.

## 2023-02-08 NOTE — ASSESSMENT & PLAN NOTE
Exercise and diet advised. No blood work needed at this time. Tdap today. Flu shot declined  Advanced directive information provided. Advised COVID vaccine booster.

## 2023-02-08 NOTE — ASSESSMENT & PLAN NOTE
Patient reports having an episode of left lower jaw pain. Seems to be in the region of the submandibular gland. No palpable mass at this time. Discussed that if you were to feel additional swelling or enlargement, should let me know and we can consider getting an ultrasound to further evaluate. Monitor for now. Can use oral NSAIDs as needed for discomfort. Follow-up as needed.

## 2023-03-14 ENCOUNTER — OFFICE VISIT (OUTPATIENT)
Dept: INTERNAL MEDICINE CLINIC | Facility: CLINIC | Age: 28
End: 2023-03-14
Payer: COMMERCIAL

## 2023-03-14 VITALS
DIASTOLIC BLOOD PRESSURE: 86 MMHG | SYSTOLIC BLOOD PRESSURE: 124 MMHG | HEART RATE: 85 BPM | BODY MASS INDEX: 29 KG/M2 | OXYGEN SATURATION: 99 % | WEIGHT: 186 LBS | RESPIRATION RATE: 17 BRPM

## 2023-03-14 DIAGNOSIS — R10.9 ABDOMINAL DISCOMFORT: Primary | ICD-10-CM

## 2023-03-14 DIAGNOSIS — R68.84 PAIN IN LOWER JAW: ICD-10-CM

## 2023-03-14 DIAGNOSIS — K21.9 GASTROESOPHAGEAL REFLUX DISEASE, UNSPECIFIED WHETHER ESOPHAGITIS PRESENT: ICD-10-CM

## 2023-03-14 PROCEDURE — 99213 OFFICE O/P EST LOW 20 MIN: CPT | Performed by: FAMILY MEDICINE

## 2023-03-14 PROCEDURE — 3079F DIAST BP 80-89 MM HG: CPT | Performed by: FAMILY MEDICINE

## 2023-03-14 PROCEDURE — 3074F SYST BP LT 130 MM HG: CPT | Performed by: FAMILY MEDICINE

## 2023-03-14 NOTE — ASSESSMENT & PLAN NOTE
Patient with intermittent abdominal discomfort, seems consistent with dyspepsia. Does have a history of acid reflux which has also been flaring. Continue lifestyle modifications  Hold off on antacid for now, check H. pylori testing. ER precautions discussed with patient in the office.

## 2023-03-14 NOTE — PATIENT INSTRUCTIONS
PATIENT INSTRUCTIONS    Thank you for seeing me today, it was a pleasure taking care of you. Please check out at the  and schedule a follow up appointment. Return if symptoms worsen or fail to improve.   Hold off on all antacids for at least 1 more week - DEFINITELY no omeprazole, famotidine, prilosec, pepcid   H pylori breath test - get this done sometime next week  If positive then I will treat you for this  If negative then I will have you start antacids at that time     Best,  Dr. Jaden Masters

## 2023-03-14 NOTE — ASSESSMENT & PLAN NOTE
Recently has been flaring. Lifestyle modifications discussed. Check H. pylori testing  Consider restarting antacids moving forward.

## 2023-11-27 ENCOUNTER — OFFICE VISIT (OUTPATIENT)
Dept: INTERNAL MEDICINE CLINIC | Facility: CLINIC | Age: 28
End: 2023-11-27
Payer: COMMERCIAL

## 2023-11-27 VITALS
SYSTOLIC BLOOD PRESSURE: 120 MMHG | OXYGEN SATURATION: 98 % | WEIGHT: 190 LBS | BODY MASS INDEX: 29.82 KG/M2 | HEIGHT: 67 IN | DIASTOLIC BLOOD PRESSURE: 74 MMHG | TEMPERATURE: 98 F | HEART RATE: 72 BPM

## 2023-11-27 DIAGNOSIS — R10.9 ABDOMINAL DISCOMFORT: Primary | ICD-10-CM

## 2023-11-27 DIAGNOSIS — K21.9 GASTROESOPHAGEAL REFLUX DISEASE, UNSPECIFIED WHETHER ESOPHAGITIS PRESENT: ICD-10-CM

## 2023-11-27 DIAGNOSIS — M25.512 ACUTE PAIN OF LEFT SHOULDER: ICD-10-CM

## 2023-11-27 NOTE — ASSESSMENT & PLAN NOTE
Patient with left shoulder pain as well as left chest wall pectoralis pain. There could be component of mild small biceps tendinitis as well. Overall improved at this time, very mild. Discussed previously that if he desires physical therapy he can message me and I can refer him to a physical therapist as needed. Can use oral NSAIDs as needed. Voltaren gel, lidocaine patches as needed. Follow-up as needed.

## 2023-11-27 NOTE — ASSESSMENT & PLAN NOTE
Patient with intermittent abdominal discomfort, seems consistent with dyspepsia. Does have a history of acid reflux which has also been flaring. Continue lifestyle modifications  Hold off on antacid for now, check H. pylori testing. If positive, will treat. If negative will advise omeprazole. ER precautions discussed with patient in the office.

## 2023-12-02 ENCOUNTER — NURSE ONLY (OUTPATIENT)
Dept: LAB | Facility: REFERENCE LAB | Age: 28
End: 2023-12-02
Attending: FAMILY MEDICINE
Payer: COMMERCIAL

## 2023-12-02 DIAGNOSIS — R10.9 ABDOMINAL DISCOMFORT: ICD-10-CM

## 2023-12-02 DIAGNOSIS — K21.9 GASTROESOPHAGEAL REFLUX DISEASE, UNSPECIFIED WHETHER ESOPHAGITIS PRESENT: ICD-10-CM

## 2023-12-02 PROCEDURE — 83013 H PYLORI (C-13) BREATH: CPT

## 2023-12-04 LAB — H PYLORI BREATH TEST: NEGATIVE

## 2024-04-19 ENCOUNTER — OFFICE VISIT (OUTPATIENT)
Dept: FAMILY MEDICINE CLINIC | Facility: CLINIC | Age: 29
End: 2024-04-19
Payer: COMMERCIAL

## 2024-04-19 VITALS
HEIGHT: 67 IN | HEART RATE: 74 BPM | BODY MASS INDEX: 29.35 KG/M2 | SYSTOLIC BLOOD PRESSURE: 136 MMHG | WEIGHT: 187 LBS | TEMPERATURE: 98 F | OXYGEN SATURATION: 98 % | RESPIRATION RATE: 16 BRPM | DIASTOLIC BLOOD PRESSURE: 78 MMHG

## 2024-04-19 DIAGNOSIS — J00 NASOPHARYNGITIS: Primary | ICD-10-CM

## 2024-04-19 LAB
CONTROL LINE PRESENT WITH A CLEAR BACKGROUND (YES/NO): YES YES/NO
KIT LOT #: NORMAL NUMERIC
STREP GRP A CUL-SCR: NEGATIVE

## 2024-04-19 PROCEDURE — 3078F DIAST BP <80 MM HG: CPT | Performed by: NURSE PRACTITIONER

## 2024-04-19 PROCEDURE — 87081 CULTURE SCREEN ONLY: CPT | Performed by: NURSE PRACTITIONER

## 2024-04-19 PROCEDURE — 3075F SYST BP GE 130 - 139MM HG: CPT | Performed by: NURSE PRACTITIONER

## 2024-04-19 PROCEDURE — 87880 STREP A ASSAY W/OPTIC: CPT | Performed by: NURSE PRACTITIONER

## 2024-04-19 PROCEDURE — 3008F BODY MASS INDEX DOCD: CPT | Performed by: NURSE PRACTITIONER

## 2024-04-19 PROCEDURE — 99213 OFFICE O/P EST LOW 20 MIN: CPT | Performed by: NURSE PRACTITIONER

## 2024-04-19 NOTE — PROGRESS NOTES
CHIEF COMPLAINT:     Chief Complaint   Patient presents with    Sore Throat     Sx 2/3 days - ST, sinus pressure  Denies cough, nasal congestion, fever, ear pain/pressure, body aches, chills  No Covid test was done at home  OTC DayQuil        HPI:   Lars Simons is a 29 year old male presents to clinic with complaint of sore throat. Onset 2-3 days.  Also reports nasal congestion/sinus pressure.  Denies cough, fever, dyspnea, sob, GI complaints, or rashes.  Tolerating PO.  No known ill contacts or travel.  Taking dayquil.      No current outpatient medications on file.      Past Medical History:    Gastroesophageal reflux disease    Left inguinal hernia    Umbilical hernia      Social History:  Social History     Socioeconomic History    Marital status:     Number of children: 3   Occupational History    Occupation:    Tobacco Use    Smoking status: Former     Current packs/day: 0.00     Average packs/day: 0.1 packs/day for 3.0 years (0.3 ttl pk-yrs)     Types: Cigarettes     Start date: 2013     Quit date: 2016     Years since quittin.1    Smokeless tobacco: Never   Vaping Use    Vaping status: Never Used   Substance and Sexual Activity    Alcohol use: Yes     Comment: Weekends - 3-4 drinks    Drug use: Yes     Types: Cannabis     Comment: Marijuana on weekends    Sexual activity: Yes     Partners: Female     Birth control/protection: Vasectomy   Social History Narrative    Relationships:  - Nena    Children: 3 kids - Marbin (12 M), Rock and Jerel (5M - twins)    Pets: 2 dogs    School: N/A    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     Origin: From [place] area originally. [place] background.     Interests: Enjoys spending time with family, watches some TV.     Spiritual: Not Roman Catholic, not spiritual        REVIEW OF SYSTEMS:   GENERAL HEALTH: feels well otherwise, normal appetite  SKIN: denies any unusual skin lesions or rashes  HEENT:  denies ear pain or difficulty swallowing/eating. See HPI  RESPIRATORY: denies shortness of breath or wheezing  CARDIOVASCULAR: denies chest pain or palpitations   GI: denies vomiting or diarrhea  NEURO: denies dizziness or lightheadedness    EXAM:   /78   Pulse 74   Temp 98.3 °F (36.8 °C)   Resp 16   Ht 5' 7\" (1.702 m)   Wt 187 lb (84.8 kg)   SpO2 98%   BMI 29.29 kg/m²   GENERAL: well developed, well nourished, in no apparent distress  SKIN: no rashes, no suspicious lesions  HEAD: atraumatic, normocephalic  EYES: conjunctiva clear, EOM intact  EARS: TM's clear, non-injected, no bulging, retraction, or fluid bilaterally  NOSE: nostrils patent, no exudates, nasal mucosa pink and noninflamed  THROAT: oral mucosa pink, moist. +Posterior pharynx erythematous and injected. No exudates. Tonsils 2+/4.  Uvula midline.  NECK: supple, non-tender  LUNGS: clear to auscultation bilaterally, no wheezes or rhonchi. Breathing is non labored. No cough.  CARDIO: RRR without murmur  LYMPH: No lymphadenopathy.    Recent Results (from the past 24 hour(s))   Strep A Assay W/Optic    Collection Time: 04/19/24  5:17 PM   Result Value Ref Range    Strep Grp A Screen Negative Negative    Control Line Present with a clear background (yes/no) Yes Yes/No    Kit Lot # [id number] Numeric    Kit Expiration Date 03/01/2025 Date         ASSESSMENT AND PLAN:   Assessment:  Lars was seen today for sore throat.    Diagnoses and all orders for this visit:    Nasopharyngitis  -     Strep A Assay W/Optic  -     Grp A Strep Cult, Throat; Future  -     Grp A Strep Cult, Throat          Plan:   - Discussed that due to symptoms and negative rapid strep this is most likely viral and does not require antibiotics.   - Will send throat culture and contact pt with results.  - Declines covid screen.  - Comfort measures explained and discussed as listed in Patient Instructions.  - Advised follow up within 1 week if not improving, condition worsens, or  new/persistent fevers.  - Pt verbalizes understanding and is agreeable w/ plan.    There are no Patient Instructions on file for this visit.

## 2024-06-04 NOTE — TELEPHONE ENCOUNTER
Sent PT letter for overdue labs. No heavy lifting.   Nothing inside the vagina for 6 weeks: no tampons, douching, sexual intercourse, tub baths or pools.   If you have a fever over 100.4F, severe abdominal pain or heavy vaginal bleeding please call your doctor or go to the emergency room.  Please follow up with your OBGYN in 6 weeks for a postpartum visit.

## 2024-09-03 ENCOUNTER — OFFICE VISIT (OUTPATIENT)
Dept: INTERNAL MEDICINE CLINIC | Facility: CLINIC | Age: 29
End: 2024-09-03
Payer: COMMERCIAL

## 2024-09-03 VITALS
OXYGEN SATURATION: 98 % | BODY MASS INDEX: 29.66 KG/M2 | HEIGHT: 67 IN | HEART RATE: 75 BPM | DIASTOLIC BLOOD PRESSURE: 86 MMHG | WEIGHT: 189 LBS | TEMPERATURE: 98 F | SYSTOLIC BLOOD PRESSURE: 114 MMHG

## 2024-09-03 DIAGNOSIS — K21.9 GASTROESOPHAGEAL REFLUX DISEASE, UNSPECIFIED WHETHER ESOPHAGITIS PRESENT: ICD-10-CM

## 2024-09-03 DIAGNOSIS — M54.50 LUMBAR BACK PAIN: ICD-10-CM

## 2024-09-03 DIAGNOSIS — E66.3 OVERWEIGHT (BMI 25.0-29.9): ICD-10-CM

## 2024-09-03 DIAGNOSIS — R00.2 PALPITATIONS: ICD-10-CM

## 2024-09-03 DIAGNOSIS — M25.512 ACUTE PAIN OF LEFT SHOULDER: ICD-10-CM

## 2024-09-03 DIAGNOSIS — Z00.00 HEALTH MAINTENANCE EXAMINATION: Primary | ICD-10-CM

## 2024-09-03 DIAGNOSIS — R10.9 ABDOMINAL DISCOMFORT: ICD-10-CM

## 2024-09-03 LAB
ALBUMIN SERPL-MCNC: 4.6 G/DL (ref 3.2–4.8)
ALBUMIN/GLOB SERPL: 1.6 {RATIO} (ref 1–2)
ALP LIVER SERPL-CCNC: 107 U/L
ALT SERPL-CCNC: 37 U/L
ANION GAP SERPL CALC-SCNC: 5 MMOL/L (ref 0–18)
AST SERPL-CCNC: 31 U/L (ref ?–34)
ATRIAL RATE: 59 BPM
BASOPHILS # BLD AUTO: 0.03 X10(3) UL (ref 0–0.2)
BASOPHILS NFR BLD AUTO: 0.6 %
BILIRUB SERPL-MCNC: 1 MG/DL (ref 0.3–1.2)
BUN BLD-MCNC: 9 MG/DL (ref 9–23)
BUN/CREAT SERPL: 9.2 (ref 10–20)
CALCIUM BLD-MCNC: 9.4 MG/DL (ref 8.7–10.4)
CHLORIDE SERPL-SCNC: 108 MMOL/L (ref 98–112)
CHOLEST SERPL-MCNC: 162 MG/DL (ref ?–200)
CO2 SERPL-SCNC: 28 MMOL/L (ref 21–32)
CREAT BLD-MCNC: 0.98 MG/DL
DEPRECATED RDW RBC AUTO: 38.7 FL (ref 35.1–46.3)
EGFRCR SERPLBLD CKD-EPI 2021: 106 ML/MIN/1.73M2 (ref 60–?)
EOSINOPHIL # BLD AUTO: 0.07 X10(3) UL (ref 0–0.7)
EOSINOPHIL NFR BLD AUTO: 1.4 %
ERYTHROCYTE [DISTWIDTH] IN BLOOD BY AUTOMATED COUNT: 12.5 % (ref 11–15)
EST. AVERAGE GLUCOSE BLD GHB EST-MCNC: 105 MG/DL (ref 68–126)
FASTING PATIENT LIPID ANSWER: NO
FASTING STATUS PATIENT QL REPORTED: NO
GLOBULIN PLAS-MCNC: 2.9 G/DL (ref 2–3.5)
GLUCOSE BLD-MCNC: 90 MG/DL (ref 70–99)
HBA1C MFR BLD: 5.3 % (ref ?–5.7)
HCT VFR BLD AUTO: 43.2 %
HDLC SERPL-MCNC: 46 MG/DL (ref 40–59)
HGB BLD-MCNC: 16 G/DL
IMM GRANULOCYTES # BLD AUTO: 0.01 X10(3) UL (ref 0–1)
IMM GRANULOCYTES NFR BLD: 0.2 %
LDLC SERPL CALC-MCNC: 99 MG/DL (ref ?–100)
LYMPHOCYTES # BLD AUTO: 2.02 X10(3) UL (ref 1–4)
LYMPHOCYTES NFR BLD AUTO: 41.1 %
MCH RBC QN AUTO: 31.8 PG (ref 26–34)
MCHC RBC AUTO-ENTMCNC: 37 G/DL (ref 31–37)
MCV RBC AUTO: 85.9 FL
MONOCYTES # BLD AUTO: 0.52 X10(3) UL (ref 0.1–1)
MONOCYTES NFR BLD AUTO: 10.6 %
NEUTROPHILS # BLD AUTO: 2.26 X10 (3) UL (ref 1.5–7.7)
NEUTROPHILS # BLD AUTO: 2.26 X10(3) UL (ref 1.5–7.7)
NEUTROPHILS NFR BLD AUTO: 46.1 %
NONHDLC SERPL-MCNC: 116 MG/DL (ref ?–130)
OSMOLALITY SERPL CALC.SUM OF ELEC: 290 MOSM/KG (ref 275–295)
P AXIS: -3 DEGREES
P-R INTERVAL: 142 MS
PLATELET # BLD AUTO: 289 10(3)UL (ref 150–450)
POTASSIUM SERPL-SCNC: 4.2 MMOL/L (ref 3.5–5.1)
PROT SERPL-MCNC: 7.5 G/DL (ref 5.7–8.2)
Q-T INTERVAL: 394 MS
QRS DURATION: 92 MS
QTC CALCULATION (BEZET): 390 MS
R AXIS: 42 DEGREES
RBC # BLD AUTO: 5.03 X10(6)UL
SODIUM SERPL-SCNC: 141 MMOL/L (ref 136–145)
T AXIS: 29 DEGREES
TRIGL SERPL-MCNC: 90 MG/DL (ref 30–149)
TSI SER-ACNC: 3.72 MIU/ML (ref 0.55–4.78)
VENTRICULAR RATE: 59 BPM
VLDLC SERPL CALC-MCNC: 15 MG/DL (ref 0–30)
WBC # BLD AUTO: 4.9 X10(3) UL (ref 4–11)

## 2024-09-03 PROCEDURE — 99395 PREV VISIT EST AGE 18-39: CPT | Performed by: FAMILY MEDICINE

## 2024-09-03 PROCEDURE — 83036 HEMOGLOBIN GLYCOSYLATED A1C: CPT | Performed by: FAMILY MEDICINE

## 2024-09-03 PROCEDURE — 85025 COMPLETE CBC W/AUTO DIFF WBC: CPT | Performed by: FAMILY MEDICINE

## 2024-09-03 PROCEDURE — 3074F SYST BP LT 130 MM HG: CPT | Performed by: FAMILY MEDICINE

## 2024-09-03 PROCEDURE — 80053 COMPREHEN METABOLIC PANEL: CPT | Performed by: FAMILY MEDICINE

## 2024-09-03 PROCEDURE — 84443 ASSAY THYROID STIM HORMONE: CPT | Performed by: FAMILY MEDICINE

## 2024-09-03 PROCEDURE — 93000 ELECTROCARDIOGRAM COMPLETE: CPT | Performed by: FAMILY MEDICINE

## 2024-09-03 PROCEDURE — 3008F BODY MASS INDEX DOCD: CPT | Performed by: FAMILY MEDICINE

## 2024-09-03 PROCEDURE — 3079F DIAST BP 80-89 MM HG: CPT | Performed by: FAMILY MEDICINE

## 2024-09-03 PROCEDURE — 87389 HIV-1 AG W/HIV-1&-2 AB AG IA: CPT | Performed by: FAMILY MEDICINE

## 2024-09-03 PROCEDURE — 80061 LIPID PANEL: CPT | Performed by: FAMILY MEDICINE

## 2024-09-03 NOTE — ASSESSMENT & PLAN NOTE
Overall controlled now.   Continue to focus on lifestyle modifications.   Can use omeprazole as needed.

## 2024-09-03 NOTE — ASSESSMENT & PLAN NOTE
Mitten short lasting lumbar back pain  Suspect muscle strain  Stretches and exercises provided.  Can take Tylenol Advil as needed  If without improvement consider physical therapy-referral provided

## 2024-09-03 NOTE — ASSESSMENT & PLAN NOTE
Exercise and diet advised.  CBC, CMP, lipid panel, Hba1c, TSH, HIV screen,   Flu shot advised during the fall  Advanced directive information provided.  Advised COVID vaccine.

## 2024-09-03 NOTE — PATIENT INSTRUCTIONS
PATIENT INSTRUCTIONS    Thank you for seeing me today, it was a pleasure taking care of you.  Please check out at the  and schedule a follow up appointment.  Return in about 1 year (around 9/3/2025) for physical.  Please remember that the alee period for all appointments is 5 minutes. This is to help maximize the amount of time that we can spend together at our visits.    Please get your labs drawn - you may need to schedule a lab appointment if this was not completed at your recent doctor's visit.  The following imaging studies were ordered: Chest x-ray  Please call [phone] to schedule your imaging appointment.   Please also follow up with the following specialists: None  Please fill out the advance directive information (power of  documents) and bring a copy to our clinic.  Holter monitor [phone].   Stretches and exercises  Consider physical therapy  Tylenol and advil as needed for pain    Best,   Dr. [facility] LABS AND IMAGING INFORMATION    Here are some lab and imaging locations available to you. Please note that some of the times and availabilities are subject to change. Please refer to the  [facility] webpage for the most recent updates. There are also additional locations that can be found on the  [facility] webpage.    To schedule a lab appointment, please call [phone].    To schedule an imaging appointment, please call [phone], option 2      [address] [address]    Lab Hours  Monday - Friday 7:30am - 5pm  Saturday 7:30am - 4pm    X-ray Hours  Monday - Friday 8am - 8pm  Sat, Sun & holidays 8am - 4:30pm      [address] [address]    Lab Hours  Monday - Friday 6:30am - 8pm  Saturday 6:30am - 3pm  Sunday 7:30am - 4pm    X-ray Hours  Monday - Friday 7am - 8pm  Saturday 7am - 3:30pm      [facility]  [address]  [address] [place] [address]    Lab Hours  Monday - Friday 7:30am - 5pm    X-ray Hours  None at this location      [facility] & [address] [address]    Lab Hours  Lab services temporarily unavailable    X-ray Hours  Monday - Friday 8am - 4:30pm      [place] [facility] [place] [address] [place] [address]    Lab Hours  Monday - Friday 7:30am - 5pm  Saturday 7:30am - 4pm    X-ray Hours  Monday - Friday 8am - 8pm  Sat, Sun & holidays 8am - 4pm      [facility]  [address]  [place] [address]    Lab Hours  Monday - Friday 7:30am - 4pm    X-ray Hours  Monday - Friday 8am - 4:30pm      [facility]  [address]  [place] [address]    Lab Hours  Monday - Friday 8am - 4:30pm    X-ray Hours  None at this location

## 2024-09-03 NOTE — ASSESSMENT & PLAN NOTE
Intermittent daily palpitations.  EKG in office today showing sinus bradycardia  Chest x-ray ordered  Check labs including CBC, CMP, TSH.  Consider deconditioning or stress.  Brother with arrhythmia history though.  Check holter.

## 2024-09-03 NOTE — PROGRESS NOTES
FAMILY MEDICINE CLINIC NOTE    HPI  Lars Simons is a 29 year old male presenting for physical    #Health Maintenance  -Diet: Ok - sometimes healthy, sometimes not healthy. Occasional fast food.   -Exercise: None  -Lung cancer screen: Not indicated  -Colon cancer screen: Not indicated  -Prostate cancer screen: Not indicated  -Aortic aneurysm screen: Not indicated  -Statin:  - 12/2021  -ASA: Not indicated  -HIV screen: Indicated  -Hep C screen: - 12/2021 negative  -Gonorrhea/chlamydia:  Not indicated  -Syphillis: Not indicated  -TB: Not indicated  -Tobacco/alcohol: Per below  -Depression: PHQ-2 score of 0 (score >/= 3 do PHQ-9)  -Advanced Directive: Indicated    #Immunizations  -Tdap: 2/2023   -Flu shot: Indicated   -PCV13: Not indicated   -PCV20: Not indicated   -PPSV23: Not indicated   -HPV: Not indicated  -RZV (preferred) or VZL: Not indicated   -RSV: Not indicated  -COVID: Pfizer      #GERD  -recently has been worsening  -knows food triggers - spicy foods, acidic drinks  -h pylori negative    #Palpitations  -intermittently faster heart beats  -occurs everyday  -not here presently  -walking up flight of stairs  -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  -no CP  -denies anxiety  -does note intermittent shortness of breath during palpitations     #Back pain  -wakes up in pain  -a couple weeks  -lower back  -comes and goes  -lasting a couple minutes  -no numbness/tingling  -no shooting pain down the legs  -slight discomfort sitting here  -no trauma or injury  -no loss of bladder or bowel control  -no fever/chills  -not taking anything for pain  -tylenol as needed - helps     #shoulder pain---resolved  -left shoulder  -travels to the chest  -present for a couple months  -slight aches  -lately has been worse  -certain positions make it worse  -slight soreness to the touch of chest   -no fever/chills  -no weakness  -no trauma or injury   -not needing anything for the pain  3/14/23  -has been doing some of the  exercises  11/2023  -overall improved, mild pain  9/2024  -overall improved       #Abdominal pain----resolved  -2 weeks ago  -one day woke up with burning sensation in abdomen  -reports significant heart burn  -lasted 4 days  -cut out coffee, acidic foods - improved slightly after 2 weeks  -last Thursday went away  -today had ribs and discomfort returned  -no fever/chills  -no N/V  -did have an episode of loose stools during the first week  -no issues with loose stools current  -took tums 2 weeks ago  11/2023  -reports heart burn comes and goes  -taking calcium carbonate  -intermittent epigastric discomfort  9/3/24  -h pylori negative  -reports no further issues        #Patient Care Team  Patient Care Team:  Bryant Hackett MD as PCP - General (Family Medicine)    ROS  GENERAL: No fever/chills, no recent weight loss   HEENT: No visual changes, no changes in hearing, no sore throats  NECK: No pain, no swelling  RESP: No cough, no SOB  CV: No chest pain, +palpitations  GI: No abd pain, no N/V/D  MSK: No edema, no pain  SKIN: No new rashes  NEURO: No numbness, no tingling, no HA    HEALTH MAINTENANCE CHECKLIST  Health Maintenance Topics with due status: Overdue       Topic Date Due    Annual Depression Screening 01/01/2024    Annual Physical 02/08/2024    COVID-19 Vaccine 09/01/2024       ALLERGIES  No Known Allergies    MEDICATIONS  No current outpatient medications on file.       ACTIVE PROBLEM  Patient Active Problem List   Diagnosis    Gastroesophageal reflux disease    Health maintenance examination    Overweight (BMI 25.0-29.9)    Palpitations    Lumbar back pain       PAST MEDICAL HISTORY  Past Medical History:    Gastroesophageal reflux disease    Left inguinal hernia    Umbilical hernia       PAST SOCIAL HISTORY  Social History     Socioeconomic History    Marital status:      Spouse name: Not on file    Number of children: 3    Years of education: Not on file    Highest education level: Not on file    Occupational History    Occupation:    Tobacco Use    Smoking status: Former     Current packs/day: 0.00     Average packs/day: 0.1 packs/day for 3.0 years (0.3 ttl pk-yrs)     Types: Cigarettes     Start date: 2013     Quit date: 2016     Years since quittin.5    Smokeless tobacco: Never   Vaping Use    Vaping status: Never Used   Substance and Sexual Activity    Alcohol use: Yes     Comment: Weekends - 3-4 drinks    Drug use: Yes     Types: Cannabis     Comment: Marijuana on weekends    Sexual activity: Yes     Partners: Female     Birth control/protection: Vasectomy   Other Topics Concern    Caffeine Concern Not Asked    Exercise Not Asked    Seat Belt Not Asked    Special Diet Not Asked    Stress Concern Not Asked    Weight Concern Not Asked   Social History Narrative    Relationships:  - Nena    Children: 3 kids - Marbin (13 M), Rock and Jerel (7M - twins)    Pets: 2 dogs    School: N/A    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     Origin: From [place] area originally. [place] background.     Interests: Enjoys spending time with family, watches some TV.     Spiritual: Not Gnosticist, not spiritual     Social Determinants of Health     Financial Resource Strain: Not on file   Food Insecurity: Not on file   Transportation Needs: Not on file   Physical Activity: Not on file   Stress: Not on file   Social Connections: Not on file   Housing Stability: Not on file       PAST SURGICAL HISTORY  Past Surgical History:   Procedure Laterality Date    Inguinal hernia repair Left 2018    Skin surgery      Lip surgery    Umbilical hernia repair N/A 2018    Vasectomy  2018    Dr. Dudley       PAST FAMILY HISTORY  Family History   Problem Relation Age of Onset    Cancer Mother 27        Uterine    Cancer Father 38        Stomach    Arrhythmia Brother     No Known Problems Brother     No Known Problems Maternal Grandmother     No Known  Problems Maternal Grandfather     Breast Cancer Paternal Grandmother     No Known Problems Paternal Grandfather     Colon Cancer Neg     Prostate Cancer Neg        PHYSICAL EXAM  Vitals:    09/03/24 1040   BP: 114/86   Pulse: 75   Temp: 97.6 °F (36.4 °C)   SpO2: 98%   Weight: 189 lb (85.7 kg)   Height: 5' 7\" (1.702 m)      Body mass index is 29.6 kg/m².    GENERAL: NAD  HEENT: Moist mucous membranes, no tonsillar swelling or erythema, PERRLA bilat, TM translucent and non-bulging  NECK: Supple, non-tender  RESP: CTAB, no wheezing, no rales, no ronchi  CV: RRR, no murmurs  GI: Soft, non-distended, non-tender, no guarding, no rebound, no masses  MSK: No edema  SKIN: Warm and dry, no rashes  NEURO: Answering questions appropriately    LABS  Lab Results   Component Value Date    WBC 4.4 12/15/2021    HGB 15.6 12/15/2021    HCT 45.8 12/15/2021    .0 12/15/2021    NEPERCENT 49.4 12/15/2021    LYPERCENT 36.2 12/15/2021    MOPERCENT 12.6 12/15/2021    EOPERCENT 1.1 12/15/2021    BAPERCENT 0.5 12/15/2021    NE 2.16 12/15/2021    LYMABS 1.58 12/15/2021    MOABSO 0.55 12/15/2021    EOABSO 0.05 12/15/2021    BAABSO 0.02 12/15/2021       Lab Results   Component Value Date     12/15/2021    K 4.8 12/15/2021     12/15/2021    CO2 30.0 12/15/2021    ANIONGAP 3 12/15/2021    BUN 12 12/15/2021    CREATSERUM 0.88 12/15/2021    BUNCREA 13.6 12/15/2021    GLU 92 12/15/2021    CA 9.2 12/15/2021    OSMOCALC 289 12/15/2021    GFRNAA 117 12/15/2021    GFRAA 135 12/15/2021    ALT 41 12/15/2021    AST 25 12/15/2021    ALKPHO 99 12/15/2021    BILT 0.8 12/15/2021    TP 7.5 12/15/2021    ALB 4.2 12/15/2021    GLOBULIN 3.3 12/15/2021    ELECTAG 1.3 12/15/2021    FASTING Yes 12/15/2021         Lab Results   Component Value Date    CHOLEST 159 12/15/2021    TRIG 60 12/15/2021    HDL 57 12/15/2021    LDL 90 12/15/2021    VLDL 12 12/15/2021    NONHDLC 102 12/15/2021        DIAGNOSTICS    ASSESSMENT/PLAN  Problem List Items Addressed  This Visit          Cardiac and Vasculature    Palpitations     Intermittent daily palpitations.  EKG in office today showing sinus bradycardia  Chest x-ray ordered  Check labs including CBC, CMP, TSH.  Consider deconditioning or stress.  Brother with arrhythmia history though.  Check holter.          Relevant Orders    Comp Metabolic Panel (14)    XR CHEST PA + LAT CHEST (CPT=71046)    CBC With Differential With Platelet    TSH W Reflex To Free T4    EKG In-Office [17142] (Completed)    CARD MONITOR HOLTER 48 HOUR (CPT=93225)       Endocrine and Metabolic    Overweight (BMI 25.0-29.9)     Healthy diet and exercise advised.         Relevant Orders    Comp Metabolic Panel (14)    Hemoglobin A1C    Lipid Panel       Gastrointestinal and Abdominal    RESOLVED: Abdominal discomfort     Pain is improved at this time.          Gastroesophageal reflux disease     Overall controlled now.   Continue to focus on lifestyle modifications.   Can use omeprazole as needed.             Musculoskeletal and Injuries    RESOLVED: Acute pain of left shoulder     Improved at this time.          Lumbar back pain     Mitten short lasting lumbar back pain  Suspect muscle strain  Stretches and exercises provided.  Can take Tylenol Advil as needed  If without improvement consider physical therapy-referral provided         Relevant Orders    Physical Therapy Referral - [facility]       Health Encounters    Health maintenance examination - Primary     Exercise and diet advised.  CBC, CMP, lipid panel, Hba1c, TSH, HIV screen,   Flu shot advised during the fall  Advanced directive information provided.  Advised COVID vaccine.         Relevant Orders    Comp Metabolic Panel (14)    Hemoglobin A1C    HIV Ag/Ab Combo    Lipid Panel    CBC With Differential With Platelet    TSH W Reflex To Free T4       Return in about 1 year (around 9/3/2025) for physical.    Bryant Hackett MD  Family Medicine

## (undated) DEVICE — UNDYED BRAIDED (POLYGLACTIN 910), SYNTHETIC ABSORBABLE SUTURE: Brand: COATED VICRYL

## (undated) DEVICE — TETRA-FLEX CF WOVEN LATEX FREE ELASTIC BANDAGE 4" X 5.5 YD: Brand: TETRA-FLEX™CF

## (undated) DEVICE — TROCAR: Brand: KII FIOS FIRST ENTRY

## (undated) DEVICE — SUTURE PROLENE 0 CT-1

## (undated) DEVICE — SUTURE VLOC 90 3-0 9\" 0644

## (undated) DEVICE — GLOVE SRG BIOGEL 8.0

## (undated) DEVICE — SOL  .9 1000ML BTL

## (undated) DEVICE — [HIGH FLOW INSUFFLATOR,  DO NOT USE IF PACKAGE IS DAMAGED,  KEEP DRY,  KEEP AWAY FROM SUNLIGHT,  PROTECT FROM HEAT AND RADIOACTIVE SOURCES.]: Brand: PNEUMOSURE

## (undated) DEVICE — SUTURE VICRYL 0 J906G

## (undated) DEVICE — TROCAR: Brand: KII® SLEEVE

## (undated) DEVICE — SUTURE SILK 2-0 SA85H

## (undated) DEVICE — ABDOMINAL BINDER: Brand: DEROYAL

## (undated) DEVICE — LAP CHOLE: Brand: MEDLINE INDUSTRIES, INC.

## (undated) NOTE — LETTER
12/12/18        [place]yla Weber  [address] [address]      Dear Marilyn Roblero records indicate that you have outstanding lab work and or testing that was ordered for you and has not yet been completed:  Orders Placed This Encounter

## (undated) NOTE — LETTER
8/24/2018              [address] [facility]         To Whom it may concern:     This is to certify that Lyla Weber had a PROCEDURE on 8/24/2018 at 1:00 PM with Mojgan Figueroa MD.        Sincerely,

## (undated) NOTE — LETTER
05/08/18        Patient: Bebe Soria   YOB: 1995   Date of Visit: 5/8/2018       Dear  Dr. Epifanio Tellez MD,      Thank you for referring Bebe Soria to my practice. Please find my assessment and plan below.           ASSE

## (undated) NOTE — ED AVS SNAPSHOT
Flavio Degroot   MRN: [id number]    Department:  [facility] Emergency Department   Date of Visit:  11/26/2019           Disclosure     Insurance plans vary and the physician(s) referred by the ER may not be covered by your plan.  Please contact CARE PHYSICIAN AT ONCE OR RETURN IMMEDIATELY TO THE EMERGENCY DEPARTMENT. If you have been prescribed any medication(s), please fill your prescription right away and begin taking the medication(s) as directed.   If you believe that any of the medications

## (undated) NOTE — LETTER
5/25/2018          To Whom It May Concern:    Iesha Phillip is currently under my medical care and may return to work on May 29, 2018. Activity is restricted as follows: No heavy lifting until June 26, 2018.   If you require additional information pleas

## (undated) NOTE — LETTER
Clement Doctor, Do  [address] [facility] [address], [place]       03/27/18        Patient: Nico Hurst   YOB: 1995   Date of Visit: 3/27/2018       Dear  Dr. Saundra Marie      Thank you for referring Nico Hurst to my practice.   Please find my arrhythmias, rheumatic fever, dvt or PE, chest pain, shortness of breath or ankle swelling. The patient exercises regularly. 4.   The patient denies pulmonary complaints of cough, asthma, emphysema, bronchitis, pneumonia or tuberculosis.    5.   The patie apparent distress, appropriate for his stated age, alert, oriented x 3 and agreeable with normal affect. HEENT exam is normal without adenopahty or thyromegaly. Lungs are clear to auscultation and percussion. Heart is regular rate and rhythm.   The periph ten days prior to procedure, he can then take Tylenol. We do discuss the preparation of eating a well-balanced meal the day of the procedure, exactly how the scrotum is shaved and prepped.   We do discuss the incisions themselves, position and what is take Prescription for Valium of 10 mg to be taken an hour before the procedure given to patient. I answered all of the patient's questions.   I spent 40 minutes with patient and well over half of the time in face to face discussion of further evaluation and th